# Patient Record
Sex: FEMALE | Race: WHITE | NOT HISPANIC OR LATINO | Employment: OTHER | URBAN - METROPOLITAN AREA
[De-identification: names, ages, dates, MRNs, and addresses within clinical notes are randomized per-mention and may not be internally consistent; named-entity substitution may affect disease eponyms.]

---

## 2022-08-24 RX ORDER — ASPIRIN 81 MG/1
81 TABLET ORAL DAILY
Status: ON HOLD | COMMUNITY
End: 2022-09-13

## 2022-08-24 RX ORDER — CITALOPRAM HYDROBROMIDE 20 MG/1
20 TABLET ORAL DAILY
COMMUNITY

## 2022-08-24 RX ORDER — ATORVASTATIN CALCIUM 80 MG/1
80 TABLET, FILM COATED ORAL DAILY
COMMUNITY

## 2022-08-24 RX ORDER — GLIPIZIDE 5 MG/1
2.5 TABLET ORAL
COMMUNITY

## 2022-08-24 RX ORDER — LISINOPRIL 5 MG/1
5 TABLET ORAL DAILY
COMMUNITY

## 2022-08-24 NOTE — PROGRESS NOTES
08/24/22 1223   Discharge Planning   Barriers to Discharge no caregiver available after discharge   Comment, Barriers to Discharge lives alone, would like to go to TCU, pt states that surgeon is aware of this   Living Arrangements   Is your private residence a single family home or apartment? Single family home  ([facility])   Number of Stairs, Within Home, Primary none   Once home, are you able to live on one level? Yes   Bathroom Shower/Tub Walk-in shower   Support System   Do you have someone available to stay with you one or two nights once you are home? No   Medical Clearance   It is recommended that you call and check with any specialty providers before surgery to see if you need surgical clearance.  Do you see any specialty providers outside of your primary care provider? No   Blood   Known bleeding disorder or coagulopathy? No   Does the patient have any Scientology/cultural preferences related to blood products? No   Education   Has the patient scheduled or completed pre-op total joint education, either in class or online, in the last 12 months? No  (will read book, no computer)   Patient attended total joint pre-op class/received pre-op teaching    (no computer)

## 2022-09-10 NOTE — PHARMACY-ADMISSION MEDICATION HISTORY
Medication reconciliation completed by pre-admitting.    Prior to Admission medications    Medication Sig Last Dose Taking? Auth Provider Long Term End Date   aspirin 81 MG EC tablet Take 81 mg by mouth daily  Yes Reported, Patient     atorvastatin (LIPITOR) 80 MG tablet Take 80 mg by mouth daily  Yes Reported, Patient Yes    Calcium Carb-Cholecalciferol (CALCIUM + D3 PO) Take by mouth daily  Yes Reported, Patient     citalopram (CELEXA) 20 MG tablet Take 20 mg by mouth daily  Yes Reported, Patient Yes    Cyanocobalamin (B-12 PO) Take by mouth daily  Yes Reported, Patient     glipiZIDE (GLUCOTROL) 5 MG tablet Take 2.5 mg by mouth 2 times daily (before meals) Take one half tablet in the morning and one half tab in the evening  Yes Reported, Patient Yes    lisinopril (ZESTRIL) 5 MG tablet Take 5 mg by mouth daily  Yes Reported, Patient Yes    metFORMIN (GLUCOPHAGE) 500 MG tablet Take 1,000 mg by mouth 2 times daily (with meals)  Yes Reported, Patient Yes    omeprazole (PRILOSEC) 20 MG DR capsule Take 20 mg by mouth daily  Yes Reported, Patient     VITAMIN E PO Take by mouth daily  Yes Reported, Patient

## 2022-09-12 ENCOUNTER — HOSPITAL ENCOUNTER (OUTPATIENT)
Facility: CLINIC | Age: 72
Discharge: NURSING, REHAB OR LONG-TERM CARE | End: 2022-09-15
Attending: ORTHOPAEDIC SURGERY | Admitting: ORTHOPAEDIC SURGERY
Payer: COMMERCIAL

## 2022-09-12 ENCOUNTER — APPOINTMENT (OUTPATIENT)
Dept: PHYSICAL THERAPY | Facility: CLINIC | Age: 72
End: 2022-09-12
Attending: ORTHOPAEDIC SURGERY
Payer: COMMERCIAL

## 2022-09-12 ENCOUNTER — ANESTHESIA EVENT (OUTPATIENT)
Dept: SURGERY | Facility: CLINIC | Age: 72
End: 2022-09-12
Payer: COMMERCIAL

## 2022-09-12 ENCOUNTER — APPOINTMENT (OUTPATIENT)
Dept: GENERAL RADIOLOGY | Facility: CLINIC | Age: 72
End: 2022-09-12
Attending: ORTHOPAEDIC SURGERY
Payer: COMMERCIAL

## 2022-09-12 ENCOUNTER — ANESTHESIA (OUTPATIENT)
Dept: SURGERY | Facility: CLINIC | Age: 72
End: 2022-09-12
Payer: COMMERCIAL

## 2022-09-12 DIAGNOSIS — M17.9 OA (OSTEOARTHRITIS) OF KNEE: ICD-10-CM

## 2022-09-12 DIAGNOSIS — Z96.652 S/P LEFT UNICOMPARTMENTAL KNEE REPLACEMENT: Primary | ICD-10-CM

## 2022-09-12 LAB
CREAT SERPL-MCNC: 1.03 MG/DL (ref 0.51–0.95)
CREAT SERPL-MCNC: 1.13 MG/DL (ref 0.51–0.95)
GFR SERPL CREATININE-BSD FRML MDRD: 52 ML/MIN/1.73M2
GFR SERPL CREATININE-BSD FRML MDRD: 58 ML/MIN/1.73M2
GLUCOSE BLDC GLUCOMTR-MCNC: 155 MG/DL (ref 70–99)
GLUCOSE BLDC GLUCOMTR-MCNC: 198 MG/DL (ref 70–99)
GLUCOSE BLDC GLUCOMTR-MCNC: 209 MG/DL (ref 70–99)
GLUCOSE BLDC GLUCOMTR-MCNC: 215 MG/DL (ref 70–99)
GLUCOSE BLDC GLUCOMTR-MCNC: 247 MG/DL (ref 70–99)
HGB BLD-MCNC: 12 G/DL (ref 11.7–15.7)
POTASSIUM SERPL-SCNC: 4.1 MMOL/L (ref 3.4–5.3)

## 2022-09-12 PROCEDURE — 258N000003 HC RX IP 258 OP 636: Performed by: ANESTHESIOLOGY

## 2022-09-12 PROCEDURE — 82565 ASSAY OF CREATININE: CPT | Performed by: ANESTHESIOLOGY

## 2022-09-12 PROCEDURE — 85018 HEMOGLOBIN: CPT | Performed by: ANESTHESIOLOGY

## 2022-09-12 PROCEDURE — 36415 COLL VENOUS BLD VENIPUNCTURE: CPT | Performed by: ANESTHESIOLOGY

## 2022-09-12 PROCEDURE — 250N000011 HC RX IP 250 OP 636: Performed by: PHYSICIAN ASSISTANT

## 2022-09-12 PROCEDURE — 272N000002 HC OR SUPPLY OTHER OPNP: Performed by: ORTHOPAEDIC SURGERY

## 2022-09-12 PROCEDURE — 84132 ASSAY OF SERUM POTASSIUM: CPT | Performed by: ANESTHESIOLOGY

## 2022-09-12 PROCEDURE — C1776 JOINT DEVICE (IMPLANTABLE): HCPCS | Performed by: ORTHOPAEDIC SURGERY

## 2022-09-12 PROCEDURE — C1713 ANCHOR/SCREW BN/BN,TIS/BN: HCPCS | Performed by: ORTHOPAEDIC SURGERY

## 2022-09-12 PROCEDURE — 258N000001 HC RX 258: Performed by: ORTHOPAEDIC SURGERY

## 2022-09-12 PROCEDURE — 250N000013 HC RX MED GY IP 250 OP 250 PS 637: Performed by: PHYSICIAN ASSISTANT

## 2022-09-12 PROCEDURE — 250N000009 HC RX 250: Performed by: ANESTHESIOLOGY

## 2022-09-12 PROCEDURE — 272N000001 HC OR GENERAL SUPPLY STERILE: Performed by: ORTHOPAEDIC SURGERY

## 2022-09-12 PROCEDURE — 710N000009 HC RECOVERY PHASE 1, LEVEL 1, PER MIN: Performed by: ORTHOPAEDIC SURGERY

## 2022-09-12 PROCEDURE — 250N000012 HC RX MED GY IP 250 OP 636 PS 637: Performed by: ANESTHESIOLOGY

## 2022-09-12 PROCEDURE — 999N000065 XR KNEE PORT LEFT 1/2 VIEWS: Mod: LT

## 2022-09-12 PROCEDURE — 250N000011 HC RX IP 250 OP 636: Performed by: NURSE ANESTHETIST, CERTIFIED REGISTERED

## 2022-09-12 PROCEDURE — 258N000003 HC RX IP 258 OP 636: Performed by: ORTHOPAEDIC SURGERY

## 2022-09-12 PROCEDURE — 250N000011 HC RX IP 250 OP 636: Performed by: ORTHOPAEDIC SURGERY

## 2022-09-12 PROCEDURE — 82962 GLUCOSE BLOOD TEST: CPT

## 2022-09-12 PROCEDURE — 370N000017 HC ANESTHESIA TECHNICAL FEE, PER MIN: Performed by: ORTHOPAEDIC SURGERY

## 2022-09-12 PROCEDURE — 250N000009 HC RX 250: Performed by: ORTHOPAEDIC SURGERY

## 2022-09-12 PROCEDURE — 82565 ASSAY OF CREATININE: CPT | Performed by: ORTHOPAEDIC SURGERY

## 2022-09-12 PROCEDURE — 250N000013 HC RX MED GY IP 250 OP 250 PS 637: Performed by: ORTHOPAEDIC SURGERY

## 2022-09-12 PROCEDURE — 250N000011 HC RX IP 250 OP 636: Performed by: ANESTHESIOLOGY

## 2022-09-12 PROCEDURE — 999N000141 HC STATISTIC PRE-PROCEDURE NURSING ASSESSMENT: Performed by: ORTHOPAEDIC SURGERY

## 2022-09-12 PROCEDURE — 250N000013 HC RX MED GY IP 250 OP 250 PS 637: Performed by: ANESTHESIOLOGY

## 2022-09-12 PROCEDURE — 360N000077 HC SURGERY LEVEL 4, PER MIN: Performed by: ORTHOPAEDIC SURGERY

## 2022-09-12 PROCEDURE — 97161 PT EVAL LOW COMPLEX 20 MIN: CPT | Mod: GP

## 2022-09-12 PROCEDURE — 36415 COLL VENOUS BLD VENIPUNCTURE: CPT | Performed by: ORTHOPAEDIC SURGERY

## 2022-09-12 PROCEDURE — 97530 THERAPEUTIC ACTIVITIES: CPT | Mod: GP

## 2022-09-12 PROCEDURE — 250N000009 HC RX 250: Performed by: NURSE ANESTHETIST, CERTIFIED REGISTERED

## 2022-09-12 DEVICE — IMPLANTABLE DEVICE: Type: IMPLANTABLE DEVICE | Site: KNEE | Status: FUNCTIONAL

## 2022-09-12 DEVICE — BONE CEMENT STRK SIMPLEX P SPEEDSET 6192-1-001: Type: IMPLANTABLE DEVICE | Site: KNEE | Status: FUNCTIONAL

## 2022-09-12 RX ORDER — DEXAMETHASONE SODIUM PHOSPHATE 4 MG/ML
INJECTION, SOLUTION INTRA-ARTICULAR; INTRALESIONAL; INTRAMUSCULAR; INTRAVENOUS; SOFT TISSUE PRN
Status: DISCONTINUED | OUTPATIENT
Start: 2022-09-12 | End: 2022-09-12

## 2022-09-12 RX ORDER — EPHEDRINE SULFATE 50 MG/ML
INJECTION, SOLUTION INTRAMUSCULAR; INTRAVENOUS; SUBCUTANEOUS PRN
Status: DISCONTINUED | OUTPATIENT
Start: 2022-09-12 | End: 2022-09-12

## 2022-09-12 RX ORDER — NALOXONE HYDROCHLORIDE 0.4 MG/ML
0.2 INJECTION, SOLUTION INTRAMUSCULAR; INTRAVENOUS; SUBCUTANEOUS
Status: DISCONTINUED | OUTPATIENT
Start: 2022-09-12 | End: 2022-09-15 | Stop reason: HOSPADM

## 2022-09-12 RX ORDER — ALBUTEROL SULFATE 0.83 MG/ML
2.5 SOLUTION RESPIRATORY (INHALATION) EVERY 4 HOURS PRN
Status: DISCONTINUED | OUTPATIENT
Start: 2022-09-12 | End: 2022-09-12 | Stop reason: HOSPADM

## 2022-09-12 RX ORDER — PROPOFOL 10 MG/ML
INJECTION, EMULSION INTRAVENOUS PRN
Status: DISCONTINUED | OUTPATIENT
Start: 2022-09-12 | End: 2022-09-12

## 2022-09-12 RX ORDER — DEXTROSE MONOHYDRATE 25 G/50ML
25-50 INJECTION, SOLUTION INTRAVENOUS
Status: DISCONTINUED | OUTPATIENT
Start: 2022-09-12 | End: 2022-09-15 | Stop reason: HOSPADM

## 2022-09-12 RX ORDER — CEFAZOLIN SODIUM/WATER 2 G/20 ML
2 SYRINGE (ML) INTRAVENOUS SEE ADMIN INSTRUCTIONS
Status: DISCONTINUED | OUTPATIENT
Start: 2022-09-12 | End: 2022-09-12 | Stop reason: HOSPADM

## 2022-09-12 RX ORDER — HYDROMORPHONE HCL IN WATER/PF 6 MG/30 ML
0.2 PATIENT CONTROLLED ANALGESIA SYRINGE INTRAVENOUS
Status: DISCONTINUED | OUTPATIENT
Start: 2022-09-12 | End: 2022-09-15 | Stop reason: HOSPADM

## 2022-09-12 RX ORDER — ONDANSETRON 2 MG/ML
INJECTION INTRAMUSCULAR; INTRAVENOUS PRN
Status: DISCONTINUED | OUTPATIENT
Start: 2022-09-12 | End: 2022-09-12

## 2022-09-12 RX ORDER — ONDANSETRON 2 MG/ML
4 INJECTION INTRAMUSCULAR; INTRAVENOUS EVERY 6 HOURS PRN
Status: DISCONTINUED | OUTPATIENT
Start: 2022-09-12 | End: 2022-09-15 | Stop reason: HOSPADM

## 2022-09-12 RX ORDER — ACETAMINOPHEN 325 MG/1
650 TABLET ORAL EVERY 4 HOURS PRN
Status: DISCONTINUED | OUTPATIENT
Start: 2022-09-15 | End: 2022-09-15 | Stop reason: HOSPADM

## 2022-09-12 RX ORDER — LIDOCAINE 40 MG/G
CREAM TOPICAL
Status: DISCONTINUED | OUTPATIENT
Start: 2022-09-12 | End: 2022-09-12 | Stop reason: HOSPADM

## 2022-09-12 RX ORDER — AMOXICILLIN 250 MG
1 CAPSULE ORAL 2 TIMES DAILY
Status: DISCONTINUED | OUTPATIENT
Start: 2022-09-12 | End: 2022-09-15 | Stop reason: HOSPADM

## 2022-09-12 RX ORDER — KETAMINE HYDROCHLORIDE 10 MG/ML
INJECTION INTRAMUSCULAR; INTRAVENOUS PRN
Status: DISCONTINUED | OUTPATIENT
Start: 2022-09-12 | End: 2022-09-12

## 2022-09-12 RX ORDER — ACETAMINOPHEN 325 MG/1
650 TABLET ORAL EVERY 4 HOURS PRN
Qty: 100 TABLET | Refills: 0 | Status: SHIPPED | OUTPATIENT
Start: 2022-09-12 | End: 2022-09-21

## 2022-09-12 RX ORDER — HYDRALAZINE HYDROCHLORIDE 20 MG/ML
2.5-5 INJECTION INTRAMUSCULAR; INTRAVENOUS EVERY 10 MIN PRN
Status: DISCONTINUED | OUTPATIENT
Start: 2022-09-12 | End: 2022-09-12 | Stop reason: HOSPADM

## 2022-09-12 RX ORDER — FAMOTIDINE 20 MG/1
20 TABLET, FILM COATED ORAL 2 TIMES DAILY
Status: DISCONTINUED | OUTPATIENT
Start: 2022-09-12 | End: 2022-09-15 | Stop reason: HOSPADM

## 2022-09-12 RX ORDER — ENOXAPARIN SODIUM 100 MG/ML
40 INJECTION SUBCUTANEOUS EVERY 24 HOURS
Status: DISCONTINUED | OUTPATIENT
Start: 2022-09-13 | End: 2022-09-15 | Stop reason: HOSPADM

## 2022-09-12 RX ORDER — ENOXAPARIN SODIUM 100 MG/ML
30 INJECTION SUBCUTANEOUS EVERY 24 HOURS
Status: DISCONTINUED | OUTPATIENT
Start: 2022-09-13 | End: 2022-09-12

## 2022-09-12 RX ORDER — BISACODYL 10 MG
10 SUPPOSITORY, RECTAL RECTAL DAILY PRN
Status: DISCONTINUED | OUTPATIENT
Start: 2022-09-12 | End: 2022-09-15 | Stop reason: HOSPADM

## 2022-09-12 RX ORDER — OXYCODONE HYDROCHLORIDE 5 MG/1
5 TABLET ORAL EVERY 4 HOURS PRN
Status: DISCONTINUED | OUTPATIENT
Start: 2022-09-12 | End: 2022-09-12 | Stop reason: HOSPADM

## 2022-09-12 RX ORDER — SODIUM CHLORIDE, SODIUM LACTATE, POTASSIUM CHLORIDE, CALCIUM CHLORIDE 600; 310; 30; 20 MG/100ML; MG/100ML; MG/100ML; MG/100ML
INJECTION, SOLUTION INTRAVENOUS CONTINUOUS
Status: DISCONTINUED | OUTPATIENT
Start: 2022-09-12 | End: 2022-09-12 | Stop reason: HOSPADM

## 2022-09-12 RX ORDER — SODIUM CHLORIDE, SODIUM LACTATE, POTASSIUM CHLORIDE, CALCIUM CHLORIDE 600; 310; 30; 20 MG/100ML; MG/100ML; MG/100ML; MG/100ML
INJECTION, SOLUTION INTRAVENOUS CONTINUOUS
Status: DISCONTINUED | OUTPATIENT
Start: 2022-09-12 | End: 2022-09-15 | Stop reason: HOSPADM

## 2022-09-12 RX ORDER — ACETAMINOPHEN 325 MG/1
975 TABLET ORAL EVERY 8 HOURS
Status: DISCONTINUED | OUTPATIENT
Start: 2022-09-12 | End: 2022-09-15 | Stop reason: HOSPADM

## 2022-09-12 RX ORDER — IBUPROFEN 600 MG/1
600 TABLET, FILM COATED ORAL EVERY 6 HOURS PRN
Status: DISCONTINUED | OUTPATIENT
Start: 2022-09-12 | End: 2022-09-15 | Stop reason: HOSPADM

## 2022-09-12 RX ORDER — ONDANSETRON 2 MG/ML
4 INJECTION INTRAMUSCULAR; INTRAVENOUS EVERY 30 MIN PRN
Status: DISCONTINUED | OUTPATIENT
Start: 2022-09-12 | End: 2022-09-12 | Stop reason: HOSPADM

## 2022-09-12 RX ORDER — POLYETHYLENE GLYCOL 3350 17 G/17G
17 POWDER, FOR SOLUTION ORAL DAILY
Status: DISCONTINUED | OUTPATIENT
Start: 2022-09-13 | End: 2022-09-15 | Stop reason: HOSPADM

## 2022-09-12 RX ORDER — PROPOFOL 10 MG/ML
INJECTION, EMULSION INTRAVENOUS CONTINUOUS PRN
Status: DISCONTINUED | OUTPATIENT
Start: 2022-09-12 | End: 2022-09-12

## 2022-09-12 RX ORDER — LABETALOL HYDROCHLORIDE 5 MG/ML
10 INJECTION, SOLUTION INTRAVENOUS
Status: DISCONTINUED | OUTPATIENT
Start: 2022-09-12 | End: 2022-09-12 | Stop reason: HOSPADM

## 2022-09-12 RX ORDER — CEFAZOLIN SODIUM/WATER 2 G/20 ML
2 SYRINGE (ML) INTRAVENOUS
Status: COMPLETED | OUTPATIENT
Start: 2022-09-12 | End: 2022-09-12

## 2022-09-12 RX ORDER — CELECOXIB 200 MG/1
400 CAPSULE ORAL ONCE
Status: COMPLETED | OUTPATIENT
Start: 2022-09-12 | End: 2022-09-12

## 2022-09-12 RX ORDER — BUPIVACAINE HYDROCHLORIDE AND EPINEPHRINE 5; 5 MG/ML; UG/ML
INJECTION, SOLUTION PERINEURAL
Status: COMPLETED | OUTPATIENT
Start: 2022-09-12 | End: 2022-09-12

## 2022-09-12 RX ORDER — HYDROMORPHONE HCL IN WATER/PF 6 MG/30 ML
0.4 PATIENT CONTROLLED ANALGESIA SYRINGE INTRAVENOUS
Status: DISCONTINUED | OUTPATIENT
Start: 2022-09-12 | End: 2022-09-15 | Stop reason: HOSPADM

## 2022-09-12 RX ORDER — TRANEXAMIC ACID 650 MG/1
1950 TABLET ORAL ONCE
Status: COMPLETED | OUTPATIENT
Start: 2022-09-12 | End: 2022-09-12

## 2022-09-12 RX ORDER — HYDROMORPHONE HYDROCHLORIDE 2 MG/1
4 TABLET ORAL EVERY 4 HOURS PRN
Status: DISCONTINUED | OUTPATIENT
Start: 2022-09-12 | End: 2022-09-14 | Stop reason: ALTCHOICE

## 2022-09-12 RX ORDER — FENTANYL CITRATE 50 UG/ML
50 INJECTION, SOLUTION INTRAMUSCULAR; INTRAVENOUS EVERY 5 MIN PRN
Status: DISCONTINUED | OUTPATIENT
Start: 2022-09-12 | End: 2022-09-12 | Stop reason: HOSPADM

## 2022-09-12 RX ORDER — PROCHLORPERAZINE MALEATE 5 MG
5 TABLET ORAL EVERY 6 HOURS PRN
Status: DISCONTINUED | OUTPATIENT
Start: 2022-09-12 | End: 2022-09-15 | Stop reason: HOSPADM

## 2022-09-12 RX ORDER — NALOXONE HYDROCHLORIDE 0.4 MG/ML
0.4 INJECTION, SOLUTION INTRAMUSCULAR; INTRAVENOUS; SUBCUTANEOUS
Status: DISCONTINUED | OUTPATIENT
Start: 2022-09-12 | End: 2022-09-15 | Stop reason: HOSPADM

## 2022-09-12 RX ORDER — HYDROXYZINE HYDROCHLORIDE 10 MG/1
10 TABLET, FILM COATED ORAL EVERY 6 HOURS PRN
Status: DISCONTINUED | OUTPATIENT
Start: 2022-09-12 | End: 2022-09-15 | Stop reason: HOSPADM

## 2022-09-12 RX ORDER — ONDANSETRON 4 MG/1
4 TABLET, ORALLY DISINTEGRATING ORAL EVERY 30 MIN PRN
Status: DISCONTINUED | OUTPATIENT
Start: 2022-09-12 | End: 2022-09-12 | Stop reason: HOSPADM

## 2022-09-12 RX ORDER — HYDROMORPHONE HYDROCHLORIDE 2 MG/1
2 TABLET ORAL EVERY 4 HOURS PRN
Status: DISCONTINUED | OUTPATIENT
Start: 2022-09-12 | End: 2022-09-14 | Stop reason: ALTCHOICE

## 2022-09-12 RX ORDER — FENTANYL CITRATE 50 UG/ML
INJECTION, SOLUTION INTRAMUSCULAR; INTRAVENOUS PRN
Status: DISCONTINUED | OUTPATIENT
Start: 2022-09-12 | End: 2022-09-12

## 2022-09-12 RX ORDER — LIDOCAINE 40 MG/G
CREAM TOPICAL
Status: DISCONTINUED | OUTPATIENT
Start: 2022-09-12 | End: 2022-09-15 | Stop reason: HOSPADM

## 2022-09-12 RX ORDER — HYDROMORPHONE HCL IN WATER/PF 6 MG/30 ML
0.4 PATIENT CONTROLLED ANALGESIA SYRINGE INTRAVENOUS EVERY 5 MIN PRN
Status: DISCONTINUED | OUTPATIENT
Start: 2022-09-12 | End: 2022-09-12 | Stop reason: HOSPADM

## 2022-09-12 RX ORDER — OXYCODONE HYDROCHLORIDE 5 MG/1
5-10 TABLET ORAL EVERY 4 HOURS PRN
Qty: 26 TABLET | Refills: 0 | Status: SHIPPED | OUTPATIENT
Start: 2022-09-12 | End: 2022-09-15

## 2022-09-12 RX ORDER — CEFAZOLIN SODIUM 2 G/100ML
2 INJECTION, SOLUTION INTRAVENOUS EVERY 8 HOURS
Status: COMPLETED | OUTPATIENT
Start: 2022-09-12 | End: 2022-09-13

## 2022-09-12 RX ORDER — ONDANSETRON 4 MG/1
4 TABLET, ORALLY DISINTEGRATING ORAL EVERY 6 HOURS PRN
Status: DISCONTINUED | OUTPATIENT
Start: 2022-09-12 | End: 2022-09-15 | Stop reason: HOSPADM

## 2022-09-12 RX ORDER — ONDANSETRON 2 MG/ML
4 INJECTION INTRAMUSCULAR; INTRAVENOUS EVERY 6 HOURS
Status: DISPENSED | OUTPATIENT
Start: 2022-09-12 | End: 2022-09-13

## 2022-09-12 RX ORDER — ENOXAPARIN SODIUM 100 MG/ML
40 INJECTION SUBCUTANEOUS EVERY 12 HOURS
Status: DISCONTINUED | OUTPATIENT
Start: 2022-09-13 | End: 2022-09-12

## 2022-09-12 RX ORDER — LISINOPRIL 5 MG/1
5 TABLET ORAL DAILY
Status: DISCONTINUED | OUTPATIENT
Start: 2022-09-12 | End: 2022-09-15 | Stop reason: HOSPADM

## 2022-09-12 RX ORDER — NICOTINE POLACRILEX 4 MG
15-30 LOZENGE BUCCAL
Status: DISCONTINUED | OUTPATIENT
Start: 2022-09-12 | End: 2022-09-15 | Stop reason: HOSPADM

## 2022-09-12 RX ORDER — AMOXICILLIN 250 MG
1-2 CAPSULE ORAL 2 TIMES DAILY
Qty: 30 TABLET | Refills: 0 | Status: SHIPPED | OUTPATIENT
Start: 2022-09-12 | End: 2022-09-16

## 2022-09-12 RX ADMIN — TRANEXAMIC ACID 1950 MG: 650 TABLET ORAL at 08:41

## 2022-09-12 RX ADMIN — ONDANSETRON 4 MG: 2 INJECTION INTRAMUSCULAR; INTRAVENOUS at 19:03

## 2022-09-12 RX ADMIN — HYDROMORPHONE HYDROCHLORIDE 0.6 MG: 1 INJECTION, SOLUTION INTRAMUSCULAR; INTRAVENOUS; SUBCUTANEOUS at 12:20

## 2022-09-12 RX ADMIN — CEFAZOLIN SODIUM 2 G: 2 INJECTION, SOLUTION INTRAVENOUS at 17:39

## 2022-09-12 RX ADMIN — BUPIVACAINE HYDROCHLORIDE AND EPINEPHRINE BITARTRATE 20 ML: 5; .005 INJECTION, SOLUTION EPIDURAL; INTRACAUDAL; PERINEURAL at 09:31

## 2022-09-12 RX ADMIN — SODIUM CHLORIDE 1000 ML: 9 INJECTION, SOLUTION INTRAVENOUS at 19:02

## 2022-09-12 RX ADMIN — ACETAMINOPHEN 975 MG: 325 TABLET, FILM COATED ORAL at 17:51

## 2022-09-12 RX ADMIN — Medication 5 MG: at 10:37

## 2022-09-12 RX ADMIN — PROPOFOL 10 MG: 10 INJECTION, EMULSION INTRAVENOUS at 11:26

## 2022-09-12 RX ADMIN — HYDROMORPHONE HYDROCHLORIDE 4 MG: 2 TABLET ORAL at 19:42

## 2022-09-12 RX ADMIN — SODIUM CHLORIDE, POTASSIUM CHLORIDE, SODIUM LACTATE AND CALCIUM CHLORIDE: 600; 310; 30; 20 INJECTION, SOLUTION INTRAVENOUS at 17:49

## 2022-09-12 RX ADMIN — DEXAMETHASONE SODIUM PHOSPHATE 8 MG: 4 INJECTION, SOLUTION INTRA-ARTICULAR; INTRALESIONAL; INTRAMUSCULAR; INTRAVENOUS; SOFT TISSUE at 10:24

## 2022-09-12 RX ADMIN — FAMOTIDINE 20 MG: 20 TABLET ORAL at 19:43

## 2022-09-12 RX ADMIN — Medication 2 G: at 10:15

## 2022-09-12 RX ADMIN — Medication 5 MG: at 10:35

## 2022-09-12 RX ADMIN — INSULIN ASPART 2 UNITS: 100 INJECTION, SOLUTION INTRAVENOUS; SUBCUTANEOUS at 13:48

## 2022-09-12 RX ADMIN — FENTANYL CITRATE 50 MCG: 50 INJECTION, SOLUTION INTRAMUSCULAR; INTRAVENOUS at 10:45

## 2022-09-12 RX ADMIN — HYDROMORPHONE HYDROCHLORIDE 0.4 MG: 1 INJECTION, SOLUTION INTRAMUSCULAR; INTRAVENOUS; SUBCUTANEOUS at 11:49

## 2022-09-12 RX ADMIN — Medication 5 MG: at 11:27

## 2022-09-12 RX ADMIN — FENTANYL CITRATE 100 MCG: 50 INJECTION, SOLUTION INTRAMUSCULAR; INTRAVENOUS at 10:24

## 2022-09-12 RX ADMIN — ACETAMINOPHEN 975 MG: 325 TABLET, FILM COATED ORAL at 23:57

## 2022-09-12 RX ADMIN — OXYCODONE HYDROCHLORIDE 5 MG: 5 TABLET ORAL at 14:51

## 2022-09-12 RX ADMIN — HYDRALAZINE HYDROCHLORIDE 2.5 MG: 20 INJECTION, SOLUTION INTRAMUSCULAR; INTRAVENOUS at 13:28

## 2022-09-12 RX ADMIN — SODIUM CHLORIDE, POTASSIUM CHLORIDE, SODIUM LACTATE AND CALCIUM CHLORIDE: 600; 310; 30; 20 INJECTION, SOLUTION INTRAVENOUS at 09:04

## 2022-09-12 RX ADMIN — PROPOFOL 40 MCG/KG/MIN: 10 INJECTION, EMULSION INTRAVENOUS at 11:03

## 2022-09-12 RX ADMIN — PROPOFOL 50 MG: 10 INJECTION, EMULSION INTRAVENOUS at 10:47

## 2022-09-12 RX ADMIN — LIDOCAINE HYDROCHLORIDE 5 ML: 10 INJECTION, SOLUTION EPIDURAL; INFILTRATION; INTRACAUDAL; PERINEURAL at 10:24

## 2022-09-12 RX ADMIN — PROPOFOL 30 MG: 10 INJECTION, EMULSION INTRAVENOUS at 10:46

## 2022-09-12 RX ADMIN — Medication 10 MG: at 10:57

## 2022-09-12 RX ADMIN — SENNOSIDES AND DOCUSATE SODIUM 1 TABLET: 50; 8.6 TABLET ORAL at 19:41

## 2022-09-12 RX ADMIN — FENTANYL CITRATE 50 MCG: 50 INJECTION, SOLUTION INTRAMUSCULAR; INTRAVENOUS at 10:46

## 2022-09-12 RX ADMIN — LISINOPRIL 5 MG: 5 TABLET ORAL at 17:51

## 2022-09-12 RX ADMIN — SODIUM CHLORIDE, POTASSIUM CHLORIDE, SODIUM LACTATE AND CALCIUM CHLORIDE: 600; 310; 30; 20 INJECTION, SOLUTION INTRAVENOUS at 11:04

## 2022-09-12 RX ADMIN — ONDANSETRON HYDROCHLORIDE 4 MG: 2 INJECTION, SOLUTION INTRAVENOUS at 12:38

## 2022-09-12 RX ADMIN — Medication 5 MG: at 11:03

## 2022-09-12 RX ADMIN — PROPOFOL 150 MG: 10 INJECTION, EMULSION INTRAVENOUS at 10:24

## 2022-09-12 RX ADMIN — CELECOXIB 400 MG: 200 CAPSULE ORAL at 08:40

## 2022-09-12 ASSESSMENT — ACTIVITIES OF DAILY LIVING (ADL)
ADLS_ACUITY_SCORE: 22

## 2022-09-12 NOTE — ANESTHESIA PROCEDURE NOTES
Airway       Patient location during procedure: OR       Procedure Start/Stop Times: 9/12/2022 10:27 AM  Staff -        CRNA: Micah Avina APRN CRNA       Performed By: CRNA  Consent for Airway        Urgency: elective  Indications and Patient Condition       Indications for airway management: shade-procedural       Induction type:intravenous       Mask difficulty assessment: 2 - vent by mask + OA or adjuvant +/- NMBA    Final Airway Details       Final airway type: supraglottic airway    Supraglottic Airway Details        Type: LMA       Brand: I-Gel       LMA size: 4    Post intubation assessment        Placement verified by: capnometry, equal breath sounds and chest rise        Number of attempts at approach: 1       Number of other approaches attempted: 0       Secured with: commercial tube ferro       Ease of procedure: easy       Dentition: Intact and Unchanged    Medication(s) Administered   Medication Administration Time: 9/12/2022 10:27 AM

## 2022-09-12 NOTE — ANESTHESIA CARE TRANSFER NOTE
Patient: Erin Ott    Procedure: Procedure(s):  Left medial unicompartmental knee arthroplasty, open reduction internal fixation intraoperative intramedullary  tibial fracture.       Diagnosis: OA (osteoarthritis) of knee [M17.10]  Diagnosis Additional Information: No value filed.    Anesthesia Type:   General     Note:    Oropharynx: oropharynx clear of all foreign objects  Level of Consciousness: drowsy  Oxygen Supplementation: face mask  Level of Supplemental Oxygen (L/min / FiO2): 5  Independent Airway: airway patency satisfactory and stable  Dentition: dentition unchanged  Vital Signs Stable: post-procedure vital signs reviewed and stable  Report to RN Given: handoff report given  Patient transferred to: PACU    Handoff Report: Identifed the Patient, Identified the Reponsible Provider, Reviewed the pertinent medical history, Discussed the surgical course, Reviewed Intra-OP anesthesia mangement and issues during anesthesia, Set expectations for post-procedure period and Allowed opportunity for questions and acknowledgement of understanding      Vitals:  Vitals Value Taken Time   /94 09/12/22 1301   Temp     Pulse 108 09/12/22 1302   Resp 25 09/12/22 1302   SpO2 100 % 09/12/22 1302   Vitals shown include unvalidated device data.    Electronically Signed By: Dean Dennis Severson, APRN CRNA  September 12, 2022  1:03 PM

## 2022-09-12 NOTE — ANESTHESIA PREPROCEDURE EVALUATION
Anesthesia Pre-Procedure Evaluation    Patient: Erin Ott   MRN: [id number] : 1950        Procedure : Procedure(s):  Left medial unicompartmental knee arthroplasty          Past Medical History:   Diagnosis Date     Arthritis      Depression      Diabetes (H)      Gastroesophageal reflux disease      Hypertension      Obese      PONV (postoperative nausea and vomiting)       Past Surgical History:   Procedure Laterality Date     HC KNEE ARTHROSCOPY, MED/LAT MENISCUS REPAIR Left      ORTHOPEDIC SURGERY Right     partial knee replacement      Allergies   Allergen Reactions     Latex Itching     Adhesive Tape Rash     Ak-Mycin [Erythromycin] Rash      Social History     Tobacco Use     Smoking status: Never Smoker     Smokeless tobacco: Never Used   Substance Use Topics     Alcohol use: Yes     Comment: rarely      Wt Readings from Last 1 Encounters:   22 95.7 kg (211 lb)        Anesthesia Evaluation            ROS/MED HX  ENT/Pulmonary:  - neg pulmonary ROS     Neurologic:       Cardiovascular:     (+) hypertension-----    METS/Exercise Tolerance:     Hematologic:       Musculoskeletal:       GI/Hepatic:     (+) GERD,     Renal/Genitourinary:       Endo:     (+) type II DM, Obesity (BMI 35),     Psychiatric/Substance Use:       Infectious Disease:       Malignancy:       Other:            Physical Exam    Airway        Mallampati: I   TM distance: > 3 FB   Neck ROM: full     Respiratory Devices and Support         Dental           Cardiovascular   cardiovascular exam normal          Pulmonary                   OUTSIDE LABS:  CBC:   Lab Results   Component Value Date    HGB 12.0 2022     BMP: No results found for: NA, POTASSIUM, CHLORIDE, CO2, BUN, CR, GLC  COAGS: No results found for: PTT, INR, FIBR  POC: No results found for: BGM, HCG, HCGS  HEPATIC: No results found for: ALBUMIN, PROTTOTAL, ALT, AST, GGT, ALKPHOS, BILITOTAL, BILIDIRECT, SERGIO  OTHER: No results found for: PH, LACT,  A1C, KELLY, PHOS, MAG, LIPASE, AMYLASE, TSH, T4, T3, CRP, SED    Anesthesia Plan    ASA Status:  2   NPO Status:  NPO Appropriate    Anesthesia Type: General.     - Airway: LMA   Induction: Intravenous.   Maintenance: Balanced.        Consents    Anesthesia Plan(s) and associated risks, benefits, and realistic alternatives discussed. Questions answered and patient/representative(s) expressed understanding.    - Discussed:     - Discussed with:  Patient         Postoperative Care    Pain management: IV analgesics, Oral pain medications, Peripheral nerve block (Single Shot).   PONV prophylaxis: Ondansetron (or other 5HT-3), Dexamethasone or Solumedrol     Comments:    Other Comments: Patient requests DANA Hurley MD

## 2022-09-12 NOTE — PLAN OF CARE
[facility]      OUTPATIENT PHYSICAL THERAPY EVALUATION  PLAN OF TREATMENT FOR OUTPATIENT REHABILITATION  (COMPLETE FOR INITIAL CLAIMS ONLY)  Patient's Last Name, First Name, M.I.  YOB: 1950  Erin Ott                        Provider's Name  [facility] Medical Record No.  [id number]                               Onset Date:  09/12/22   Start of Care Date:    9/12/2022     Type:     _X_PT   ___OT   ___SLP Medical Diagnosis:   s/p L knee surgery                        PT Diagnosis:  impaired functional mobility   Visits from SOC:  1   _________________________________________________________________________________  Plan of Treatment/Functional Goals    Planned Interventions: balance training, bed mobility training, gait training, home exercise program, neuromuscular re-education, ROM (range of motion), strengthening, transfer training, progressive activity/exercise, risk factor education, home program guidelines     Goals: See Physical Therapy Goals on Care Plan in [facility] electronic health record.    Therapy Frequency: Daily  Predicted Duration of Therapy Intervention: 09/14/22  _________________________________________________________________________________    I CERTIFY THE NEED FOR THESE SERVICES FURNISHED UNDER        THIS PLAN OF TREATMENT AND WHILE UNDER MY CARE     (Physician co-signature of this document indicates review and certification of the therapy plan).               ,      Referring Physician: Chester Rodriguez MD            Initial Assessment        See Physical Therapy evaluation dated   in Epic electronic health record.

## 2022-09-12 NOTE — ANESTHESIA PROCEDURE NOTES
Adductor canal Procedure Note    Pre-Procedure   Staff -        Anesthesiologist:  Vanesa Hurley MD       Performed By: anesthesiologist       Location: pre-op       Procedure Start/Stop Times: 9/12/2022 9:31 AM and 9/12/2022 9:34 AM       Pre-Anesthestic Checklist: patient identified, IV checked, site marked, risks and benefits discussed, informed consent, monitors and equipment checked, pre-op evaluation, at physician/surgeon's request and post-op pain management  Timeout:       Correct Patient: Yes        Correct Procedure: Yes        Correct Site: Yes        Correct Position: Yes        Correct Laterality: Yes        Site Marked: Yes  Procedure Documentation  Procedure: Adductor canal       Laterality: left       Patient Position: supine       Skin prep: Betadine       Needle Type: short bevel       Needle Gauge: 20.        Needle Length (Inches): 4        Ultrasound guided       1. Ultrasound was used to identify targeted nerve, plexus, vascular marker, or fascial plane and place a needle adjacent to it in real-time.       2. Ultrasound was used to visualize the spread of anesthetic in close proximity to the above referenced structure.       4. The visualized anatomic structures appeared normal.       5. There were no apparent abnormal pathologic findings.    Assessment/Narrative         Bolus given via needle..        Secured via.        Insertion/Infusion Method: Single Shot       Complications: none       Injection made incrementally with aspirations every 5 mL.    Medication(s) Administered   Bupivacaine 0.5% w/ 1:200K Epi (Other) - Other   20 mL - 9/12/2022 9:31:00 AM  Medication Administration Time: 9/12/2022 9:31 AM

## 2022-09-12 NOTE — OP NOTE
Procedure Date: 09/12/2022    PREOPERATIVE DIAGNOSIS:  Left knee medial compartment osteoarthritis.    POSTOPERATIVE DIAGNOSIS:  Left knee medial compartment osteoarthritis, intraoperative tibial eminence fracture.      PROCEDURE:    1.  Left knee unicompartmental knee arthroplasty using Arthrex medial compartment unicompartmental knee arthroplasty.    2.  Open reduction and internal fixation of left tibial eminence fracture.    SURGEON:  Chester Rodriguez MD    FIRST ASSISTANT:  Mariana Burch PA-C    INDICATIONS FOR SURGERY:  Ms. Ott is a very pleasant 72-year-old female who has had medial sided left knee pain for some time now, has failed conservative management with oral analgesics, activity modifications and injections, and now wished to proceed with operative intervention.  We had a long discussion of risks, benefits, and alternatives of unicompartmental knee arthroplasty.  She understands the risks, benefits, and alternatives.  She has a medial compartment unicompartmental knee arthroplasty on her right side, which has done extremely well for the last 8 years.  She understands the risks, benefits and alternatives of the procedure, and wished to proceed with surgery.    NARRATIVE EVENTS:  After thorough evaluation and proper identification of the patient to be operated on, Ms. Ott was taken to the operating room where she underwent a general anesthetic as well as a femoral nerve block, placed supine on the operating table.  Left leg was prepped and draped in the usual sterile manner.  After appropriate surgical pause confirming the patient's extremity to be operated on, 10 minutes after the patient received 2 grams of Ancef, left leg was exsanguinated and tourniquet was raised to 300 mmHg on the left upper thigh.  We approached the left knee through a median parapatellar arthrotomy.  The skin and soft tissue sharply dissected down to the patella where a median parapatellar arthrotomy was performed.  We  subluxed the patella, removed the infrapatellar fat pad, and evaluated the compartments of the knee.  The medial compartment had complete loss of cartilage, particularly distally and posteriorly with grade 4 chondral changes both on the femur and on the tibia.  The femoral trochlea was well preserved as well as the lateral compartment as well as the lateral meniscus and the medial facet of the patella showed some grade 2 and grade 3 chondral wear, but I felt that this was stable and she had been tolerating this well it would be reasonable to proceed with unicompartmental knee arthroplasty.  So at this point, we performed a mild medial release.  We placed into position our Arthrex proximal tibial cut block and made our tibial resection of 7 mm width of the tibial plateau perpendicular to the long axis of the tibia with the vertical axis of the cut just medial to the ACL footprint.  We removed this biscuit of proximal tibia and measured our flexion gap.  We felt we had about a 9 mm flexion gap.  This equated to about a 10 mm extension gap so we made our distal femoral resection at 8 mm.  We did this using the Arthrex cut blocks tensioning out the extension space.  Once this was done, we then proceeded to make our posterior resection again using the ACL tensioning guide to create a 16 mm flexion gap.  Once this was done, we then sized the distal femur.  It sized to fit a size 2 Arthrex femoral component and we made our chamfer and leg resections.  Once this was done, we removed excess soft tissue from the knee, mainly the remnants of the medial meniscus.  At this point, we sized the proximal tibia.  It sized to fit a size 3 tibial component and we placed our trial implants into position, a size 2 femur, 3 tibia with a 10 mm thick polyethylene insert.  This gave us good stability and full range of motion and appropriate soft tissue tension.  So at this point, we marked our tibial location, punched for our tibial keel,  and prepared to cement in our final components using one batch of Simplex SpeedSet cement.  We cemented in a size 2 femur and a 3 tibia.  Once cement had cured, we retrialed our polyethylene inserts and found that the 11 mm insert gave us the best stability and the appropriate range of motion but in the process of removing the trial polyethylene, we recognized that there was a small fracture in the tibial eminence right as the ACL attached, so we felt that we should address this.  We did so by reducing the fracture anatomically and placing a 2.4 screw over a washer that reduced the fracture nicely.  We also placed a #2 FiberWire stitch in a Maurilio-Wilbur fashion in the ACL and then we drilled 2 bone tunnels and passed these sutures through the bone tunnels in the ACL footprint and then passed these sutures through this such that we tied them on the medial tibial face around near the area of the pes anserine and we tied these over a bone bridge stable at helping to act as a belt suspender stabilizing our ACL and taking some tension off the fracture repair.  At this point, we felt that bringing the knee through a range of motion that the fracture was stable, the knee was stable.  We had good range of motion.  We had appropriate soft tissue tension.  We thoroughly irrigated the knee with a dilute Betadine solution followed by saline.  We removed all the excess cement from the knee and then we closed the arthrotomy with #1 Vicryl sutures and a running #1 Stratafix suture.  We placed 1 gram of powder vancomycin deep to the arthrotomy and we closed the skin and soft tissue with absorbable sutures and a Prineo skin closure.  The patient was placed in a well-padded postoperative dressing.  She was taken to recovery room in stable condition.  She tolerated the procedure without difficulty.    When she begins rehabilitation, she can weightbear as tolerated with full knee range of motion but I will have her do her formal  rehabilitation work with an ACL stabilizing brace.    Chester Rodriguez MD        D: 2022   T: 2022   MT: [id number]    Name:     PIPER TSE  MRN:      [id number]        Account:        [id number]   :      1950           Procedure Date: 2022     Document: [id number]

## 2022-09-12 NOTE — ANESTHESIA POSTPROCEDURE EVALUATION
Patient: Erin Ott    Procedure: Procedure(s):  Left medial unicompartmental knee arthroplasty, open reduction internal fixation intraoperative intramedullary  tibial fracture.       Anesthesia Type:  General    Note:  Disposition: Outpatient   Postop Pain Control: Uneventful            Sign Out: Well controlled pain   PONV: No   Neuro/Psych: Uneventful            Sign Out: Acceptable/Baseline neuro status   Airway/Respiratory: Uneventful            Sign Out: Acceptable/Baseline resp. status   CV/Hemodynamics: Uneventful            Sign Out: Acceptable CV status; No obvious hypovolemia; No obvious fluid overload   Other NRE: NONE   DID A NON-ROUTINE EVENT OCCUR? No           Last vitals:  Vitals Value Taken Time   /81 09/12/22 1425   Temp 98.3  F (36.8  C) 09/12/22 1300   Pulse 86 09/12/22 1426   Resp 14 09/12/22 1426   SpO2 93 % 09/12/22 1426   Vitals shown include unvalidated device data.    Electronically Signed By: Vanesa Hurley MD  September 12, 2022  2:28 PM

## 2022-09-12 NOTE — BRIEF OP NOTE
[facility]    Brief Operative Note    Pre-operative diagnosis: OA (osteoarthritis) of knee [M17.10]  Post-operative diagnosis Same as pre-operative diagnosis    Procedure: Procedure(s):  Left medial unicompartmental knee arthroplasty, open reduction internal fixation intraoperative intramedullary  tibial fracture.  Surgeon: Surgeon(s) and Role:     * Chester Rodriguez MD - Primary     * Mariana Burch PA-C - Assisting  Anesthesia: General with Block   Estimated Blood Loss: Less than 10 ml    Drains: None  Specimens: * No specimens in log *  Findings:   None.  Complications: None.  Implants:   Implant Name Type Inv. Item Serial No.  Lot No. LRB No. Used Action   BONE CEMENT STRK SIMPLEX P SPEEDSET 6192-1-001 - [id number] Cement, Bone BONE CEMENT STRK SIMPLEX P SPEEDSET 6192-1-001  [employer] [id number] Left 1 Implanted   IMP COMP UKA IBALANCE FEM UNI JESS SZ 2 LT-MEDIAL AR-[id number] - [id number] Total Joint Component/Insert IMP COMP UKA IBALANCE FEM UNI JESS SZ 2 LT-MEDIAL AR-501-[id number]  [employer] [id number] Left 1 Implanted   IBALANCE UKA TIBIAL CEMENTED TRAY, SIZE 3, LM/RL Total Joint Component/Insert   [employer] [id number] Left 1 Implanted   IBALANCE UKA, TIBIAL BEARING IMPLANT, VIT E, SIZE 3, 11MM THICKNESS Total Joint Component/Insert   [employer]X [id number] Left 1 Implanted   IMP SCR SYN CORTEX 2.4X32MM SELF .662 - [id number] Metallic Hardware/[place] IMP SCR SYN CORTEX 2.4X32MM SELF .662  [id number] Left 1 Implanted

## 2022-09-12 NOTE — PROGRESS NOTES
09/12/22 1800   Quick Adds   Type of Visit Initial PT Evaluation   Living Environment   People in Home alone   Current Living Arrangements other (see comments)  ([facility])   Home Accessibility no concerns   Number of Stairs, Within Home, Primary none   Living Environment Comments Pt lives alone in [facility]; no PRADIP. Walk in shower with small lip, 1 grab bar.   Self-Care   Usual Activity Tolerance moderate   Current Activity Tolerance fair   Regular Exercise No   Equipment Currently Used at Home raised toilet seat  (pt owns SPC, borrowing shower chair)   Fall history within last six months no   Activity/Exercise/Self-Care Comment Pt reports IND with mobility and ADLs/self cares at baseline. Was using SPC recently d/t pain.   General Information   Onset of Illness/Injury or Date of Surgery 09/12/22   Referring Physician Chester Rodriguez MD   Patient/Family Therapy Goals Statement (PT) go to TCU   Pertinent History of Current Problem (include personal factors and/or comorbidities that impact the POC) Pt is 71 yo female who underwentLeft medial unicompartmental knee arthroplasty, open reduction internal fixation intraoperative intramedullary  tibial fracture on 9/12/22.   Existing Precautions/Restrictions fall;weight bearing   Weight-Bearing Status - LLE weight-bearing as tolerated   Cognition   Affect/Mental Status (Cognition) WNL   Orientation Status (Cognition) oriented x 3   Pain Assessment   Patient Currently in Pain Yes, see Vital Sign flowsheet   Integumentary/Edema   Integumentary/Edema Comments L ACE bandage intact   Posture    Posture Forward head position;Protracted shoulders   Range of Motion (ROM)   ROM Comment L knee flexion ~25 degrees   Strength (Manual Muscle Testing)   Strength Comments unable to significantly WB through LLE d/t numbness; difficulty with LLE SLR; RLE WFL   Bed Mobility   Comment, (Bed Mobility) supine<>sit with CGA and use of strap   Transfers   Comment, (Transfers)  sit<>stand with CGA and FWW   Gait/Stairs (Locomotion)   Comment, (Gait/Stairs) side stepping along EOB with FWW and min A; mild buckling of LLE d/t numbness   Balance   Balance Comments impaired; requiring use of AD   Sensory Examination   Sensory Perception other (describe)   Sensory Perception Comments noting numbness in LLE   Clinical Impression   Criteria for Skilled Therapeutic Intervention Yes, treatment indicated   PT Diagnosis (PT) impaired functional mobility   Influenced by the following impairments post-op status, weakness, pain   Functional limitations due to impairments difficulty with bed mobility, transfers, ambulation   Clinical Presentation (PT Evaluation Complexity) Stable/Uncomplicated   Clinical Presentation Rationale clinical judgement   Clinical Decision Making (Complexity) low complexity   Planned Therapy Interventions (PT) balance training;bed mobility training;gait training;home exercise program;neuromuscular re-education;ROM (range of motion);strengthening;transfer training;progressive activity/exercise;risk factor education;home program guidelines   Anticipated Equipment Needs at Discharge (PT) walker, standard   Risk & Benefits of therapy have been explained evaluation/treatment results reviewed;care plan/treatment goals reviewed;risks/benefits reviewed;current/potential barriers reviewed;participants voiced agreement with care plan;participants included;patient   PT Discharge Planning   PT Discharge Recommendation (DC Rec)   (defer to ortho)   PT Rationale for DC Rec Patient reports her goal that she discussed with surgeon prior to surgery is to go to TCU to increase mobility prior to returning home as she lives alone. Ambulation limited this date due to numbness in LLE with mild buckling.   PT Brief overview of current status SPT with FWW to commode for nursing   Plan of Care Review   Plan of Care Reviewed With patient   Total Evaluation Time   Total Evaluation Time (Minutes) 10    Physical Therapy Goals   PT Frequency Daily   PT Predicted Duration/Target Date for Goal Attainment 09/14/22   PT Goals Bed Mobility;Transfers;Gait   PT: Bed Mobility Modified independent;Supine to/from sit;Within precautions   PT: Transfers Modified independent;Sit to/from stand;Assistive device;Within precautions   PT: Gait Modified independent;Assistive device;Greater than 200 feet;Within precautions

## 2022-09-13 ENCOUNTER — APPOINTMENT (OUTPATIENT)
Dept: PHYSICAL THERAPY | Facility: CLINIC | Age: 72
End: 2022-09-13
Attending: ORTHOPAEDIC SURGERY
Payer: COMMERCIAL

## 2022-09-13 ENCOUNTER — APPOINTMENT (OUTPATIENT)
Dept: OCCUPATIONAL THERAPY | Facility: CLINIC | Age: 72
End: 2022-09-13
Attending: ORTHOPAEDIC SURGERY
Payer: COMMERCIAL

## 2022-09-13 LAB
GLUCOSE BLDC GLUCOMTR-MCNC: 112 MG/DL (ref 70–99)
GLUCOSE BLDC GLUCOMTR-MCNC: 147 MG/DL (ref 70–99)
GLUCOSE BLDC GLUCOMTR-MCNC: 152 MG/DL (ref 70–99)
GLUCOSE BLDC GLUCOMTR-MCNC: 160 MG/DL (ref 70–99)
GLUCOSE BLDC GLUCOMTR-MCNC: 185 MG/DL (ref 70–99)
GLUCOSE BLDC GLUCOMTR-MCNC: 57 MG/DL (ref 70–99)
HBA1C MFR BLD: 7.2 %
HGB BLD-MCNC: 9.9 G/DL (ref 11.7–15.7)
PLATELET # BLD AUTO: 313 10E3/UL (ref 150–450)

## 2022-09-13 PROCEDURE — 250N000013 HC RX MED GY IP 250 OP 250 PS 637: Performed by: NURSE PRACTITIONER

## 2022-09-13 PROCEDURE — 97110 THERAPEUTIC EXERCISES: CPT | Mod: GP

## 2022-09-13 PROCEDURE — 36415 COLL VENOUS BLD VENIPUNCTURE: CPT | Performed by: ORTHOPAEDIC SURGERY

## 2022-09-13 PROCEDURE — 97116 GAIT TRAINING THERAPY: CPT | Mod: GP

## 2022-09-13 PROCEDURE — 99207 PR NO BILLABLE SERVICE THIS VISIT: CPT | Performed by: NURSE PRACTITIONER

## 2022-09-13 PROCEDURE — 85049 AUTOMATED PLATELET COUNT: CPT | Performed by: ORTHOPAEDIC SURGERY

## 2022-09-13 PROCEDURE — 82962 GLUCOSE BLOOD TEST: CPT

## 2022-09-13 PROCEDURE — 97165 OT EVAL LOW COMPLEX 30 MIN: CPT | Mod: GO

## 2022-09-13 PROCEDURE — 97530 THERAPEUTIC ACTIVITIES: CPT | Mod: GP

## 2022-09-13 PROCEDURE — 83036 HEMOGLOBIN GLYCOSYLATED A1C: CPT | Performed by: ORTHOPAEDIC SURGERY

## 2022-09-13 PROCEDURE — 97535 SELF CARE MNGMENT TRAINING: CPT | Mod: GO

## 2022-09-13 PROCEDURE — 250N000011 HC RX IP 250 OP 636: Performed by: ORTHOPAEDIC SURGERY

## 2022-09-13 PROCEDURE — L1852 KO DOUBLE UPRIGHT PREFAB OTS: HCPCS

## 2022-09-13 PROCEDURE — 250N000013 HC RX MED GY IP 250 OP 250 PS 637: Performed by: PHYSICIAN ASSISTANT

## 2022-09-13 PROCEDURE — 250N000013 HC RX MED GY IP 250 OP 250 PS 637: Performed by: ORTHOPAEDIC SURGERY

## 2022-09-13 PROCEDURE — 85018 HEMOGLOBIN: CPT | Performed by: ORTHOPAEDIC SURGERY

## 2022-09-13 PROCEDURE — 96372 THER/PROPH/DIAG INJ SC/IM: CPT | Performed by: ORTHOPAEDIC SURGERY

## 2022-09-13 RX ORDER — PANTOPRAZOLE SODIUM 40 MG/1
40 TABLET, DELAYED RELEASE ORAL DAILY
Status: DISCONTINUED | OUTPATIENT
Start: 2022-09-13 | End: 2022-09-15 | Stop reason: HOSPADM

## 2022-09-13 RX ORDER — CITALOPRAM HYDROBROMIDE 20 MG/1
20 TABLET ORAL DAILY
Status: DISCONTINUED | OUTPATIENT
Start: 2022-09-13 | End: 2022-09-15 | Stop reason: HOSPADM

## 2022-09-13 RX ORDER — ATORVASTATIN CALCIUM 40 MG/1
80 TABLET, FILM COATED ORAL DAILY
Status: DISCONTINUED | OUTPATIENT
Start: 2022-09-13 | End: 2022-09-15 | Stop reason: HOSPADM

## 2022-09-13 RX ADMIN — ACETAMINOPHEN 975 MG: 325 TABLET, FILM COATED ORAL at 15:50

## 2022-09-13 RX ADMIN — ENOXAPARIN SODIUM 40 MG: 40 INJECTION SUBCUTANEOUS at 14:40

## 2022-09-13 RX ADMIN — HYDROMORPHONE HYDROCHLORIDE 2 MG: 2 TABLET ORAL at 18:22

## 2022-09-13 RX ADMIN — HYDROXYZINE HYDROCHLORIDE 10 MG: 10 TABLET ORAL at 00:05

## 2022-09-13 RX ADMIN — METFORMIN HYDROCHLORIDE 1000 MG: 500 TABLET, FILM COATED ORAL at 09:01

## 2022-09-13 RX ADMIN — GLIPIZIDE 2.5 MG: 5 TABLET ORAL at 09:04

## 2022-09-13 RX ADMIN — FAMOTIDINE 20 MG: 20 TABLET ORAL at 07:51

## 2022-09-13 RX ADMIN — SENNOSIDES AND DOCUSATE SODIUM 1 TABLET: 50; 8.6 TABLET ORAL at 22:16

## 2022-09-13 RX ADMIN — CEFAZOLIN SODIUM 2 G: 2 INJECTION, SOLUTION INTRAVENOUS at 02:36

## 2022-09-13 RX ADMIN — METFORMIN HYDROCHLORIDE 1000 MG: 500 TABLET, FILM COATED ORAL at 15:52

## 2022-09-13 RX ADMIN — HYDROMORPHONE HYDROCHLORIDE 2 MG: 2 TABLET ORAL at 02:35

## 2022-09-13 RX ADMIN — PANTOPRAZOLE SODIUM 40 MG: 40 TABLET, DELAYED RELEASE ORAL at 09:01

## 2022-09-13 RX ADMIN — SENNOSIDES AND DOCUSATE SODIUM 1 TABLET: 50; 8.6 TABLET ORAL at 07:50

## 2022-09-13 RX ADMIN — HYDROMORPHONE HYDROCHLORIDE 2 MG: 2 TABLET ORAL at 18:24

## 2022-09-13 RX ADMIN — ACETAMINOPHEN 975 MG: 325 TABLET, FILM COATED ORAL at 07:49

## 2022-09-13 RX ADMIN — CITALOPRAM HYDROBROMIDE 20 MG: 20 TABLET ORAL at 09:01

## 2022-09-13 RX ADMIN — HYDROMORPHONE HYDROCHLORIDE 4 MG: 2 TABLET ORAL at 11:14

## 2022-09-13 RX ADMIN — Medication 1 LOZENGE: at 00:05

## 2022-09-13 RX ADMIN — ATORVASTATIN CALCIUM 80 MG: 40 TABLET, FILM COATED ORAL at 09:01

## 2022-09-13 RX ADMIN — LISINOPRIL 5 MG: 5 TABLET ORAL at 07:49

## 2022-09-13 RX ADMIN — FAMOTIDINE 20 MG: 20 TABLET ORAL at 22:16

## 2022-09-13 RX ADMIN — HYDROMORPHONE HYDROCHLORIDE 4 MG: 2 TABLET ORAL at 22:16

## 2022-09-13 RX ADMIN — HYDROMORPHONE HYDROCHLORIDE 4 MG: 2 TABLET ORAL at 06:51

## 2022-09-13 RX ADMIN — GLIPIZIDE 2.5 MG: 5 TABLET ORAL at 15:50

## 2022-09-13 ASSESSMENT — ACTIVITIES OF DAILY LIVING (ADL)
IADL_COMMENTS: INDP BASELINE
ADLS_ACUITY_SCORE: 21
ADLS_ACUITY_SCORE: 21
ADLS_ACUITY_SCORE: 22
DEPENDENT_IADLS:: INDEPENDENT
ADLS_ACUITY_SCORE: 21
ADLS_ACUITY_SCORE: 22

## 2022-09-13 NOTE — PROGRESS NOTES
"Orthopedic Surgery  9/13/2022  POD#: 1    S: Patient voices no complaints today. Pain managed well, sitting comfortably at bedside.    O: Blood pressure 112/47, pulse 94, temperature 97.3  F (36.3  C), temperature source Temporal, resp. rate 14, height 1.651 m (5' 5\"), weight 95.5 kg (210 lb 9.6 oz), SpO2 94 %.  Lab Results   Component Value Date    HGB 9.9 09/13/2022     No results found for: INR     I/O last 3 completed shifts:  In: 1810 [P.O.:160; I.V.:1650]  Out: 2000 [Urine:2000]    Neurovascularly intact.  Calves are negative bilaterally, both soft and nontender.  The wound is C/D/I. Exofin intact and dry.  The wound looks good with minimal erythema of the surrounding skin.    A: Ms. Ott is doing well status post Procedure(s):  Left medial unicompartmental knee arthroplasty, open reduction internal fixation intraoperative tibial fracture.    P: Orthotics to return to fit patient with ACL stabilizing brace, with ACE wrap and dressings removed.  1. Mobilize and continue physical therapy. WBAT with ACL stabilizing brace and walker.  May remove brace to bathe, sleep, and when resting and elevating leg.  2. Anticoagulation - discharge on ASA  3. Pain management - controlled  4. Anticipate discharge to home vs TCU pending authorization.  Pt prefers TCU but awaiting insurance auth and bed placement. SWS consulted to discuss options with patient.      Mariana Burch PA-C  O: [phone]  "

## 2022-09-13 NOTE — CONSULTS
"Medicine Brief Note  9/13/2022 7:40 AM     Chart reviewed.    73 yo F with PMH of OA, T2DM (on oral medications), GERD, HTN, and obesity who presents for elective medial unicompartment knee arthroplasty, and ORIF intraoperative intramedullary tibial fracture under GETA with block on 9/12/2022.  EBL < 10.  No apparent anesthesia complication.     /60 (BP Location: Right arm)   Pulse 88   Temp 97  F (36.1  C) (Temporal)   Resp 14   Ht 1.651 m (5' 5\")   Wt 95.5 kg (210 lb 9.6 oz)   SpO2 95%   BMI 35.05 kg/m       -209  Plt 313  Hgb 9.9 (preop 12).    Pending tx evaluation today.    Resumed home medications.    Continue correctional scale coverage.  Hypoglycemia protocol.  Abx, DVTp (Lovenox), pain management and activity restrictions per surgery.      No acute medical issues to manage at this time.    Medicine available for any acute issues should they arise.      Please page Medicine pager for any acute medical issues.    Otherwise Medicine service will not formally see.    BALTA Kat CNP   "

## 2022-09-13 NOTE — PROGRESS NOTES
09/13/22 0900   Quick Adds   Type of Visit Initial Occupational Therapy Evaluation   Living Environment   People in Home alone   Current Living Arrangements other (see comments)   Home Accessibility no concerns   Number of Stairs, Within Home, Primary none   Living Environment Comments alone in townhouse. accessible   Self-Care   Usual Activity Tolerance moderate   Current Activity Tolerance fair   Regular Exercise No   Equipment Currently Used at Home shower chair;raised toilet seat  (leg )   Fall history within last six months no   Activity/Exercise/Self-Care Comment indp with mobility, self care and ADL   Instrumental Activities of Daily Living (IADL)   IADL Comments indp baseline   General Information   Onset of Illness/Injury or Date of Surgery 09/12/22   Referring Physician Chester Rodriguez MD   Patient/Family Therapy Goal Statement (OT) to go to rehab   Additional Occupational Profile Info/Pertinent History of Current Problem 73 y/o female POD 1 Left medial unicompartmental knee arthroplasty, open reduction internal fixation intraoperative tibial fracture.   Existing Precautions/Restrictions fall;weight bearing   General Observations and Info per discussion with ortho PA we are awaiting a knee brace to be fitted prior to OOB activity. order for WBAT   Cognitive Status Examination   Orientation Status orientation to person, place and time   Cognitive Status Comments eccentric   Visual Perception   Visual Impairment/Limitations corrective lenses full-time   Sensory   Sensory Quick Adds No deficits were identified   Pain Assessment   Patient Currently in Pain Yes, see Vital Sign flowsheet   Integumentary/Edema   Integumentary/Edema Comments ACE wrap in place on L knee   Range of Motion Comprehensive   Comment, General Range of Motion limited to 50% full ROM at  baseline deficits bilaterally   Strength Comprehensive (MMT)   Comment, General Manual Muscle Testing (MMT) Assessment  strength  equal bilaterally. generalized weakness   Coordination   Upper Extremity Coordination No deficits were identified   Bed Mobility   Bed Mobility supine-sit   Supine-Sit [place] (Bed Mobility) minimum assist (75% patient effort)   Assistive Device (Bed Mobility) leg    Comment (Bed Mobility) dangle at EOB ok per ortho PA   Activities of Daily Living   BADL Assessment/Intervention bathing;lower body dressing;upper body dressing   Bathing Assessment/Intervention   [place] Level (Bathing) moderate assist (50% patient effort)   Upper Body Dressing Assessment/Training   [place] Level (Upper Body Dressing) set up   Lower Body Dressing Assessment/Training   [place] Level (Lower Body Dressing) maximum assist (25% patient effort)   Clinical Impression   Criteria for Skilled Therapeutic Interventions Met (OT) Yes, treatment indicated;Evaluation only   OT Diagnosis decreased function in ADL   OT Problem List-Impairments impacting ADL problems related to;pain;post-surgical precautions;mobility;strength   Assessment of Occupational Performance 3-5 Performance Deficits   Identified Performance Deficits bathing, transfer, toileting, dressing   Planned Therapy Interventions (OT) ADL retraining   Intervention Comments defer to next level of care   Clinical Decision Making Complexity (OT) low complexity   Risk & Benefits of therapy have been explained evaluation/treatment results reviewed;care plan/treatment goals reviewed;risks/benefits reviewed;current/potential barriers reviewed;participants included;participants voiced agreement with care plan;patient   Clinical Impression Comments decreased function in ADL warrants skilled IP OT tx   OT Discharge Planning   OT Discharge Recommendation (DC Rec)   (per ortho)   OT Rationale for DC Rec pt below baseline function in self care and ADL and requires skilled IP OT tx. does not have caregiver support at home. lives alone.  pt OUT status, plan for discharge to TCU.  needs OT to return to PLOF but can defer to next level of care. eval only.   Total Evaluation Time (Minutes)   Total Evaluation Time (Minutes) 23

## 2022-09-13 NOTE — PLAN OF CARE
Goal Outcome Evaluation:        Patient vital signs are at baseline: Yes  Patient able to ambulate as they were prior to admission or with assist devices provided by therapies during their stay:  Yespt is A-1 with gate belt and walker.  Patient MUST void prior to discharge:  Yes  Patient able to tolerate oral intake:  Yes  Pain has adequate pain control using Oral analgesics:  Yes  Does patient have an identified :  No,  Reason:  No identified .  Has goal D/C date and time been discussed with patient:  No,  Reason:  pt will discharge when meets criteria.

## 2022-09-13 NOTE — CONSULTS
Care Management Initial Consult    General Information  Assessment completed with: Patient, Patient  Type of CM/SW Visit: Initial Assessment    Primary Care Provider verified and updated as needed: Yes   Readmission within the last 30 days: no previous admission in last 30 days      Reason for Consult: discharge planning  Advance Care Planning:            Communication Assessment  Patient's communication style: spoken language (English or Bilingual)    Hearing Difficulty or Deaf: no   Wear Glasses or Blind: yes    Cognitive  Cognitive/Neuro/Behavioral: WDL                      Living Environment:   People in home: alone     Current living Arrangements: house      Able to return to prior arrangements:         Family/Social Support:  Care provided by: self  Provides care for: no one  Marital Status: Single  Sibling(s)          Description of Support System: Supportive, Involved    Support Assessment: Adequate family and caregiver support    Current Resources:   Patient receiving home care services: No     Community Resources: None  Equipment currently used at home: shower chair, raised toilet seat (leg )  Supplies currently used at home:      Employment/Financial:  Employment Status:          Financial Concerns:             Lifestyle & Psychosocial Needs:  Social Determinants of Health     Tobacco Use: Low Risk      Smoking Tobacco Use: Never Smoker     Smokeless Tobacco Use: Never Used   Alcohol Use: Not on file   Financial Resource Strain: Not on file   Food Insecurity: Not on file   Transportation Needs: Not on file   Physical Activity: Not on file   Stress: Not on file   Social Connections: Not on file   Intimate Partner Violence: Not on file   Depression: Not on file   Housing Stability: Not on file       Functional Status:  Prior to admission patient needed assistance:   Dependent ADLs:: Independent  Dependent IADLs:: Independent  Assesssment of Functional Status: Not at baseline with ADL  Functioning    Mental Health Status:          Chemical Dependency Status:                Values/Beliefs:  Spiritual, Cultural Beliefs, Quaker Practices, Values that affect care:                 Additional Information:    Met with pt at bedside to discuss discharge planning. Pt explained that she is independent at baseline and does not have anyone at home to take care of her. Pt would like to go to TCU at discharge, pt open to [facility] or [facility]. Explained that if pt is ambulating well once the brace is on there is potential that her insurance may not cover a TCU stay. Offered the option of homecare. Will see how pt does after PT with her brace. Referrals sent. SW following.     Addendum    Updated that pt ambulated well in PT. Awaiting PT updated recommendations. Pt explained that she would like this writer to send more TCU referrals nearby and would consider paying privately if not covered by insurance.    CLAUDE Warren, [facility]  Inpatient Care Coordination  Ortho/Spine Unit  [phone]  Rylei Valladares, [facility]

## 2022-09-13 NOTE — PROGRESS NOTES
Here for functional knee brace.  Patient has padding and ace-wrap.  I have an orthosis that may fit her once wrap removed, otherwise orthosis would have to be ordered.    I would concerned on changing volume.  I talked to RN who will follow when  Or  comes in to see patient. Perhaps post op knee brace short term and patient seen for functional brace outpatient?  Please call orthotics [phone].  Thanks,Florida Evans.

## 2022-09-13 NOTE — PLAN OF CARE
Goal Outcome Evaluation:      Patient vital signs are at baseline: Yes  Patient able to ambulate as they were prior to admission or with assist devices provided by therapies during their stay:  No,  Reason:  up with assist of one, gait belt/walker to the bedside commode.  Patient MUST void prior to discharge:  Yes  Patient able to tolerate oral intake:  Yes  Pain has adequate pain control using Oral analgesics:  Yes  Does patient have an identified :  No,  Reason:  lives alone.  Has goal D/C date and time been discussed with patient:  Yes, pt going to TCU when stable.

## 2022-09-13 NOTE — PROGRESS NOTES
I fit patient with functional ACL Gaston Ardon earlier.  I explained wear,care and precautions.  I came by later with written instructions.  Patient said did well with P.T. and orthosis still feels good, didn't have any impingement when ambulating.  I gave patient contact information as well.  Valeriy Evans.

## 2022-09-13 NOTE — PLAN OF CARE
PT A&O, vitals monitored on RA.  Up SBA in room, gb and ww with hinged knee brace.  Pain managed with po dilaudid.  Voiding.  CMS intact.  Prineo dressing intact. Pt requesting to discharge to TCU, however waiting on PT recommendations.  SW planning with pt in process.

## 2022-09-14 ENCOUNTER — APPOINTMENT (OUTPATIENT)
Dept: PHYSICAL THERAPY | Facility: CLINIC | Age: 72
End: 2022-09-14
Attending: ORTHOPAEDIC SURGERY
Payer: COMMERCIAL

## 2022-09-14 LAB
GLUCOSE BLDC GLUCOMTR-MCNC: 109 MG/DL (ref 70–99)
GLUCOSE BLDC GLUCOMTR-MCNC: 127 MG/DL (ref 70–99)
GLUCOSE BLDC GLUCOMTR-MCNC: 130 MG/DL (ref 70–99)
GLUCOSE BLDC GLUCOMTR-MCNC: 144 MG/DL (ref 70–99)
GLUCOSE BLDC GLUCOMTR-MCNC: 148 MG/DL (ref 70–99)
GLUCOSE BLDC GLUCOMTR-MCNC: 162 MG/DL (ref 70–99)
HGB BLD-MCNC: 10.2 G/DL (ref 11.7–15.7)

## 2022-09-14 PROCEDURE — 97530 THERAPEUTIC ACTIVITIES: CPT | Mod: GP

## 2022-09-14 PROCEDURE — 82962 GLUCOSE BLOOD TEST: CPT

## 2022-09-14 PROCEDURE — 96372 THER/PROPH/DIAG INJ SC/IM: CPT | Performed by: ORTHOPAEDIC SURGERY

## 2022-09-14 PROCEDURE — 250N000011 HC RX IP 250 OP 636: Performed by: ORTHOPAEDIC SURGERY

## 2022-09-14 PROCEDURE — 250N000013 HC RX MED GY IP 250 OP 250 PS 637: Performed by: NURSE PRACTITIONER

## 2022-09-14 PROCEDURE — 250N000013 HC RX MED GY IP 250 OP 250 PS 637: Performed by: PHYSICIAN ASSISTANT

## 2022-09-14 PROCEDURE — 250N000013 HC RX MED GY IP 250 OP 250 PS 637: Performed by: ORTHOPAEDIC SURGERY

## 2022-09-14 PROCEDURE — 85018 HEMOGLOBIN: CPT | Performed by: ORTHOPAEDIC SURGERY

## 2022-09-14 PROCEDURE — 97116 GAIT TRAINING THERAPY: CPT | Mod: GP

## 2022-09-14 PROCEDURE — 36415 COLL VENOUS BLD VENIPUNCTURE: CPT | Performed by: ORTHOPAEDIC SURGERY

## 2022-09-14 RX ORDER — OXYCODONE HYDROCHLORIDE 5 MG/1
5-10 TABLET ORAL
Status: DISCONTINUED | OUTPATIENT
Start: 2022-09-14 | End: 2022-09-15 | Stop reason: HOSPADM

## 2022-09-14 RX ADMIN — ATORVASTATIN CALCIUM 80 MG: 40 TABLET, FILM COATED ORAL at 08:40

## 2022-09-14 RX ADMIN — ACETAMINOPHEN 975 MG: 325 TABLET, FILM COATED ORAL at 08:40

## 2022-09-14 RX ADMIN — IBUPROFEN 600 MG: 600 TABLET ORAL at 08:40

## 2022-09-14 RX ADMIN — ACETAMINOPHEN 975 MG: 325 TABLET, FILM COATED ORAL at 17:04

## 2022-09-14 RX ADMIN — OXYCODONE HYDROCHLORIDE 10 MG: 5 TABLET ORAL at 10:30

## 2022-09-14 RX ADMIN — SENNOSIDES AND DOCUSATE SODIUM 1 TABLET: 50; 8.6 TABLET ORAL at 08:41

## 2022-09-14 RX ADMIN — HYDROMORPHONE HYDROCHLORIDE 4 MG: 2 TABLET ORAL at 02:18

## 2022-09-14 RX ADMIN — HYDROMORPHONE HYDROCHLORIDE 4 MG: 2 TABLET ORAL at 06:33

## 2022-09-14 RX ADMIN — FAMOTIDINE 20 MG: 20 TABLET ORAL at 19:47

## 2022-09-14 RX ADMIN — METFORMIN HYDROCHLORIDE 1000 MG: 500 TABLET, FILM COATED ORAL at 08:40

## 2022-09-14 RX ADMIN — OXYCODONE HYDROCHLORIDE 5 MG: 5 TABLET ORAL at 14:35

## 2022-09-14 RX ADMIN — METFORMIN HYDROCHLORIDE 1000 MG: 500 TABLET, FILM COATED ORAL at 17:04

## 2022-09-14 RX ADMIN — OXYCODONE HYDROCHLORIDE 5 MG: 5 TABLET ORAL at 22:33

## 2022-09-14 RX ADMIN — SENNOSIDES AND DOCUSATE SODIUM 1 TABLET: 50; 8.6 TABLET ORAL at 19:47

## 2022-09-14 RX ADMIN — LISINOPRIL 5 MG: 5 TABLET ORAL at 08:40

## 2022-09-14 RX ADMIN — OXYCODONE HYDROCHLORIDE 5 MG: 5 TABLET ORAL at 18:26

## 2022-09-14 RX ADMIN — GLIPIZIDE 2.5 MG: 5 TABLET ORAL at 08:40

## 2022-09-14 RX ADMIN — HYDROXYZINE HYDROCHLORIDE 10 MG: 10 TABLET ORAL at 08:40

## 2022-09-14 RX ADMIN — PANTOPRAZOLE SODIUM 40 MG: 40 TABLET, DELAYED RELEASE ORAL at 08:41

## 2022-09-14 RX ADMIN — ENOXAPARIN SODIUM 40 MG: 40 INJECTION SUBCUTANEOUS at 14:35

## 2022-09-14 RX ADMIN — CITALOPRAM HYDROBROMIDE 20 MG: 20 TABLET ORAL at 08:40

## 2022-09-14 RX ADMIN — FAMOTIDINE 20 MG: 20 TABLET ORAL at 08:41

## 2022-09-14 RX ADMIN — GLIPIZIDE 2.5 MG: 5 TABLET ORAL at 17:04

## 2022-09-14 ASSESSMENT — ACTIVITIES OF DAILY LIVING (ADL)
ADLS_ACUITY_SCORE: 21

## 2022-09-14 NOTE — PLAN OF CARE
Goal Outcome Evaluation:                Patient vital signs are at baseline: Yes  Patient able to ambulate as they were prior to admission or with assist devices provided by therapies during their stay:  Yes, pt is A-1 with walker and gate belt.  Patient MUST void prior to discharge:  Yes  Patient able to tolerate oral intake:  Yes  Pain has adequate pain control using Oral analgesics:  Yes  Does patient have an identified :  Yes  Has goal D/C date and time been discussed with patient:  Yes, pt requested to disharge to TCU, SW warking on it.     Pt is A&OX3, hinged barce on when she  up.

## 2022-09-14 NOTE — PLAN OF CARE
Pt A&O, vitals monitored on RA.  Pt agitated today about discharge disposition - see SW note.  Pain medications changed from dilaudid to oxycodone, pt demanding every 4 hours and upset if even 1 minute beyond time available.  LLE swollen today, incision CDI.  Per ortho ok to not don brace for short distances, mostly will be needed at home.  Voiding.  Regular diet.  Discharge pending.

## 2022-09-14 NOTE — PROGRESS NOTES
"Orthopedic Surgery  9/14/2022    S: Patient voices no complaints today.     O: Blood pressure 139/58, pulse 90, temperature 97.1  F (36.2  C), temperature source Temporal, resp. rate 14, height 1.651 m (5' 5\"), weight 95.5 kg (210 lb 9.6 oz), SpO2 93 %.  Lab Results   Component Value Date    HGB 10.2 09/14/2022     No results found for: INR     Neurovascularly intact.  Calves are negative bilaterally, both soft and nontender.  The wound is C/D/I.  The wound looks good with minimal erythema of the surrounding skin.    A: Ms. Ott is doing well status post Procedure(s):  1.  Left knee unicompartmental knee arthroplasty using Arthrex medial compartment unicompartmental knee arthroplasty.   2.  Open reduction and internal fixation of left tibial eminence fracture..    P: Continue physical therapy.   Anticoagulation lovenox, home on ASA  Pain management  Discharge planning, Sw working on TCU vs home with home care    Chester Rodriguez MD  [phone]    "

## 2022-09-14 NOTE — PLAN OF CARE
Patient vital signs are at baseline: Yes  Patient able to ambulate as they were prior to admission or with assist devices provided by therapies during their stay:  No,  Reason:  Pt plans to go to TCU.    Patient MUST void prior to discharge:  Yes  Patient able to tolerate oral intake:  Yes  Pain has adequate pain control using Oral analgesics:  Yes  Does patient have an identified :  Yes  Has goal D/C date and time been discussed with patient:  Yes

## 2022-09-14 NOTE — PROGRESS NOTES
Care Management Follow Up    Length of Stay (days): 0    Expected Discharge Date: 09/14/2022     Concerns to be Addressed:  Patient requesting to go to TCU, patient hospital class status is Out-patient.     Patient plan of care discussed at interdisciplinary rounds: Yes    Anticipated Discharge Disposition:  Patient requesting to go to TCU as she does not have any one to help her at home.     Anticipated Discharge Services:  TCU vs home with home care.    Anticipated Discharge DME:  worker    Patient/family educated on Medicare website which has current facility and service quality ratings:  yes  Education Provided on the Discharge Plan: yes, reviewed with patient with ortho doctor in room. Patient is a out-patient class, explained to patient that we are not her insurance would pay for TCU secondary to her being out-patient status.  Called in [employer] advantage medicare for authorization for TCU. Waiting atomization.     Also discussed the possibility that she might have to pay privately if her insurance doesn't cover her. She would need between $5000 - $[phone] down payment before she transfer to a facility under private pay situation. She reported that she's not worried about this.       Referrals Placed by CM/SW:  yes  Private pay costs discussed: private room/amenity fees, transportation costs and insurance costs out of pocket expenses, co-pays and deductibles    Addendum: [employer] has approved 7 days TCU stay at [facility] for tomorrow. Patient wanted [facility].    Transport: Reviewed out of pocket cost for [facility] transport, $81.80 for base rate and $5.26 per mile to the destination. Spoke with patient and sister-in-law, Chelsie, they expressed understanding and are agreeable to this.     Dischrge tomorrow Thursday 9/15/22 to [facility] @ 1430 via  wheelchair.    Unit and PA notified.    Marjan Emmanuel, [facility] SYDNIE   Inpatient Care Coordination   Supervisor  [facility]  [facility]  [phone]    Marjan Emmanuel, [facility] Center[facility]

## 2022-09-15 ENCOUNTER — APPOINTMENT (OUTPATIENT)
Dept: PHYSICAL THERAPY | Facility: CLINIC | Age: 72
End: 2022-09-15
Attending: ORTHOPAEDIC SURGERY
Payer: COMMERCIAL

## 2022-09-15 ENCOUNTER — LAB REQUISITION (OUTPATIENT)
Dept: LAB | Facility: CLINIC | Age: 72
End: 2022-09-15

## 2022-09-15 VITALS
HEART RATE: 91 BPM | BODY MASS INDEX: 35.09 KG/M2 | DIASTOLIC BLOOD PRESSURE: 76 MMHG | RESPIRATION RATE: 16 BRPM | TEMPERATURE: 97.3 F | SYSTOLIC BLOOD PRESSURE: 167 MMHG | WEIGHT: 210.6 LBS | HEIGHT: 65 IN | OXYGEN SATURATION: 95 %

## 2022-09-15 DIAGNOSIS — Z11.1 ENCOUNTER FOR SCREENING FOR RESPIRATORY TUBERCULOSIS: ICD-10-CM

## 2022-09-15 DIAGNOSIS — Z96.652 S/P LEFT UNICOMPARTMENTAL KNEE REPLACEMENT: ICD-10-CM

## 2022-09-15 LAB
GLUCOSE BLDC GLUCOMTR-MCNC: 124 MG/DL (ref 70–99)
GLUCOSE BLDC GLUCOMTR-MCNC: 136 MG/DL (ref 70–99)
GLUCOSE BLDC GLUCOMTR-MCNC: 99 MG/DL (ref 70–99)
HOLD SPECIMEN: NORMAL
PLATELET # BLD AUTO: 284 10E3/UL (ref 150–450)

## 2022-09-15 PROCEDURE — 36415 COLL VENOUS BLD VENIPUNCTURE: CPT | Performed by: ORTHOPAEDIC SURGERY

## 2022-09-15 PROCEDURE — 82962 GLUCOSE BLOOD TEST: CPT

## 2022-09-15 PROCEDURE — 97116 GAIT TRAINING THERAPY: CPT | Mod: GP | Performed by: PHYSICAL THERAPIST

## 2022-09-15 PROCEDURE — 97110 THERAPEUTIC EXERCISES: CPT | Mod: GP | Performed by: PHYSICAL THERAPIST

## 2022-09-15 PROCEDURE — 96372 THER/PROPH/DIAG INJ SC/IM: CPT | Performed by: ORTHOPAEDIC SURGERY

## 2022-09-15 PROCEDURE — 85049 AUTOMATED PLATELET COUNT: CPT | Performed by: ORTHOPAEDIC SURGERY

## 2022-09-15 PROCEDURE — 250N000013 HC RX MED GY IP 250 OP 250 PS 637: Performed by: ORTHOPAEDIC SURGERY

## 2022-09-15 PROCEDURE — 250N000011 HC RX IP 250 OP 636: Performed by: ORTHOPAEDIC SURGERY

## 2022-09-15 PROCEDURE — 250N000013 HC RX MED GY IP 250 OP 250 PS 637: Performed by: NURSE PRACTITIONER

## 2022-09-15 PROCEDURE — 250N000013 HC RX MED GY IP 250 OP 250 PS 637: Performed by: PHYSICIAN ASSISTANT

## 2022-09-15 RX ORDER — OXYCODONE HYDROCHLORIDE 5 MG/1
5-10 TABLET ORAL EVERY 4 HOURS PRN
Qty: 30 TABLET | Refills: 0 | Status: SHIPPED | OUTPATIENT
Start: 2022-09-15 | End: 2022-09-19

## 2022-09-15 RX ADMIN — GLIPIZIDE 2.5 MG: 5 TABLET ORAL at 07:31

## 2022-09-15 RX ADMIN — OXYCODONE HYDROCHLORIDE 5 MG: 5 TABLET ORAL at 13:50

## 2022-09-15 RX ADMIN — POLYETHYLENE GLYCOL 3350 17 G: 17 POWDER, FOR SOLUTION ORAL at 07:31

## 2022-09-15 RX ADMIN — OXYCODONE HYDROCHLORIDE 10 MG: 5 TABLET ORAL at 10:21

## 2022-09-15 RX ADMIN — OXYCODONE HYDROCHLORIDE 5 MG: 5 TABLET ORAL at 06:28

## 2022-09-15 RX ADMIN — ACETAMINOPHEN 975 MG: 325 TABLET, FILM COATED ORAL at 09:37

## 2022-09-15 RX ADMIN — ENOXAPARIN SODIUM 40 MG: 40 INJECTION SUBCUTANEOUS at 12:42

## 2022-09-15 RX ADMIN — LISINOPRIL 5 MG: 5 TABLET ORAL at 07:31

## 2022-09-15 RX ADMIN — ATORVASTATIN CALCIUM 80 MG: 40 TABLET, FILM COATED ORAL at 07:31

## 2022-09-15 RX ADMIN — CITALOPRAM HYDROBROMIDE 20 MG: 20 TABLET ORAL at 07:31

## 2022-09-15 RX ADMIN — PANTOPRAZOLE SODIUM 40 MG: 40 TABLET, DELAYED RELEASE ORAL at 07:31

## 2022-09-15 RX ADMIN — METFORMIN HYDROCHLORIDE 1000 MG: 500 TABLET, FILM COATED ORAL at 07:31

## 2022-09-15 RX ADMIN — SENNOSIDES AND DOCUSATE SODIUM 1 TABLET: 50; 8.6 TABLET ORAL at 07:31

## 2022-09-15 RX ADMIN — FAMOTIDINE 20 MG: 20 TABLET ORAL at 07:31

## 2022-09-15 RX ADMIN — OXYCODONE HYDROCHLORIDE 5 MG: 5 TABLET ORAL at 02:28

## 2022-09-15 ASSESSMENT — ACTIVITIES OF DAILY LIVING (ADL)
ADLS_ACUITY_SCORE: 21

## 2022-09-15 NOTE — TELEPHONE ENCOUNTER
New admission to TCU, did not come with a script for oxycodone. Nurse called the hospital, but no one is there to do this now.

## 2022-09-15 NOTE — PROVIDER NOTIFICATION
Received call from TCU requesting copy of hard copy of oxycodone script. Called [facility] with request, information given to [facility] reception...they stated they would notify on call of urgent need for script. Fax number for TCU given (had received from TCU).

## 2022-09-15 NOTE — PLAN OF CARE
Goal Outcome Evaluation:           Patient vital signs are at baseline: Yes  Patient able to ambulate as they were prior to admission or with assist devices provided by therapies during their stay:  Yes  Patient MUST void prior to discharge:  Yes  Patient able to tolerate oral intake:  Yes  Pain has adequate pain control using Oral analgesics:  Yes  Does patient have an identified :  Yes  Has goal D/C date and time been discussed with patient:  Yes,9/15/22 to TCU.

## 2022-09-15 NOTE — PLAN OF CARE
Goal Outcome Evaluation:  Patient vital signs are at baseline: Yes  Patient able to ambulate as they were prior to admission or with assist devices provided by therapies during their stay:  Yes  Patient MUST void prior to discharge:  Yes  Patient able to tolerate oral intake:  Yes  Pain has adequate pain control using Oral analgesics:  Yes  Does patient have an identified :  Yes  Has goal D/C date and time been discussed with patient:  Yes    A&Ox4, VSS, on RA.  LS CTA.  Pain managed with scheduled Tylenol, prn oxycodone. Pain rated 3-8/10.  LLE edema noted, incision with clear bandage on, no drainage.  Ice pack applied prn.  Denies numbness/tingling, CMS intact.  Up with assist 1/gait belt/walker.  Voiding without difficulty, no BM, passing flatus.  PIV removed.  Discharge packet given to .  Discharge reviewed with patient.  All personal belongings in patients possession at discharge.  Discharged via w/c to TCU.

## 2022-09-15 NOTE — PROGRESS NOTES
Care Management Discharge Note    Discharge Date: 09/14/2022       Discharge Disposition:  TCU [facility] today    Discharge Services:  TCU    Discharge DME:  TCU will suppy    Discharge Transportation: [facility] wheelchair     Private pay costs discussed: private room/amenity fees and transportation costs    PAS Confirmation Code: [id number]   Patient/family educated on Medicare website which has current facility and service quality ratings: yes     Education Provided on the Discharge Plan:  yes  Persons Notified of Discharge Plans: yes  Patient/Family in Agreement with the Plan:  yes    Handoff Referral Completed: No    Additional Information:  Patient is discharging to [facility] ([place], [place]) today via wheelchair at 230p. Orders have been faxed and confirmed with facility.      wheelchair @ 230pm.    RICHI Clayton   Inpatient Care Coordination   Supervisor  [facility]  [phone]        RICHI Garcia

## 2022-09-15 NOTE — PROGRESS NOTES
"Orthopedic Surgery  9/15/2022  POD#: 3    S: Patient voices no complaints today.     O: Blood pressure (!) 167/76, pulse 91, temperature 97.3  F (36.3  C), temperature source Temporal, resp. rate 16, height 1.651 m (5' 5\"), weight 95.5 kg (210 lb 9.6 oz), SpO2 95 %.  Lab Results   Component Value Date    HGB 10.2 09/14/2022     No results found for: INR     I/O last 3 completed shifts:  In: 180 [P.O.:180]  Out: 550 [Urine:550]    Neurovascularly intact.  Calves are negative bilaterally, both soft and nontender.  The wound is C/D/I. Exofin dressing intact without drainage. A couple very small blisters distally, which appear normal.  The wound looks good with minimal erythema of the surrounding skin.    A: Ms. Ott is doing well status post Procedure(s):  1.  Left knee unicompartmental knee arthroplasty using Arthrex medial compartment unicompartmental knee arthroplasty.   2.  Open reduction and internal fixation of left tibial eminence fracture..    P: No additional dressings to be placed on incisional mesh  1. Mobilize and continue physical therapy. WBAT ambulating using ACL stabilizing brace.  May remove when not ambulating.  2. Anticoagulation - discharge on ASA  3. Pain management - controlled  4. Anticipate discharge to [facility] this afternoon.      Mariana Burch PA-C  O: [phone]  "

## 2022-09-15 NOTE — PROGRESS NOTES
Care Management Discharge Note    Discharge Date: 09/14/2022       Additional Information:  PAS completed 0853 on 9/15/22. [id number]    CLAUDE Luo, [facility]  Emergency Room   [phone]-Please contact the SW on the floor in which the patient is staying for any questions or concerns

## 2022-09-15 NOTE — PLAN OF CARE
VSS, Afebrile.   Lung sounds clear.    +BS +flatus.  Tolerating diet.   Voiding.   CMS+  Pain controlled. Using Oxycodone 5mgs every 4hrs, Tylenol.   Blisters around incision and ortho ok to not use brace for short distances, mostly will be needed at home  Moves well. SBA  Alert and oriented   Discharge plan: Tomorrow TCU WC transport tbd    No significant issues this shift, will continue to monitor.  Goal Outcome Evaluation:    Plan of Care Reviewed With: patient

## 2022-09-15 NOTE — PLAN OF CARE
Physical Therapy Discharge Summary    Reason for therapy discharge:    Discharged to transitional care facility.    Progress towards therapy goal(s). See goals on Care Plan in [facility] electronic health record for goal details.  Goals not met.  Barriers to achieving goals:   discharge from facility.    Therapy recommendation(s):    Continued therapy is recommended.  Rationale/Recommendations:   .  Continued PT to progress LE strength, ROM and independence with all mobility.  Goal Outcome Evaluation:

## 2022-09-16 ENCOUNTER — TRANSITIONAL CARE UNIT VISIT (OUTPATIENT)
Dept: GERIATRICS | Facility: CLINIC | Age: 72
End: 2022-09-16
Payer: COMMERCIAL

## 2022-09-16 VITALS
DIASTOLIC BLOOD PRESSURE: 67 MMHG | SYSTOLIC BLOOD PRESSURE: 125 MMHG | WEIGHT: 210 LBS | RESPIRATION RATE: 16 BRPM | BODY MASS INDEX: 34.99 KG/M2 | TEMPERATURE: 97.4 F | OXYGEN SATURATION: 95 % | HEART RATE: 76 BPM | HEIGHT: 65 IN

## 2022-09-16 DIAGNOSIS — Z87.81 S/P ORIF (OPEN REDUCTION INTERNAL FIXATION) FRACTURE: ICD-10-CM

## 2022-09-16 DIAGNOSIS — D62 ACUTE BLOOD LOSS ANEMIA: ICD-10-CM

## 2022-09-16 DIAGNOSIS — E11.9 TYPE 2 DIABETES MELLITUS WITHOUT COMPLICATION, WITHOUT LONG-TERM CURRENT USE OF INSULIN (H): ICD-10-CM

## 2022-09-16 DIAGNOSIS — N18.2 CKD (CHRONIC KIDNEY DISEASE) STAGE 2, GFR 60-89 ML/MIN: ICD-10-CM

## 2022-09-16 DIAGNOSIS — M17.12 OSTEOARTHRITIS OF LEFT KNEE, UNSPECIFIED OSTEOARTHRITIS TYPE: Primary | ICD-10-CM

## 2022-09-16 DIAGNOSIS — K21.9 GASTROESOPHAGEAL REFLUX DISEASE, UNSPECIFIED WHETHER ESOPHAGITIS PRESENT: ICD-10-CM

## 2022-09-16 DIAGNOSIS — Z98.890 S/P ORIF (OPEN REDUCTION INTERNAL FIXATION) FRACTURE: ICD-10-CM

## 2022-09-16 DIAGNOSIS — E66.01 CLASS 2 SEVERE OBESITY WITH SERIOUS COMORBIDITY AND BODY MASS INDEX (BMI) OF 35.0 TO 35.9 IN ADULT, UNSPECIFIED OBESITY TYPE (H): ICD-10-CM

## 2022-09-16 DIAGNOSIS — R53.81 PHYSICAL DECONDITIONING: ICD-10-CM

## 2022-09-16 DIAGNOSIS — Z96.652 S/P LEFT UNICOMPARTMENTAL KNEE REPLACEMENT: ICD-10-CM

## 2022-09-16 DIAGNOSIS — E66.812 CLASS 2 SEVERE OBESITY WITH SERIOUS COMORBIDITY AND BODY MASS INDEX (BMI) OF 35.0 TO 35.9 IN ADULT, UNSPECIFIED OBESITY TYPE (H): ICD-10-CM

## 2022-09-16 DIAGNOSIS — E78.5 DYSLIPIDEMIA: ICD-10-CM

## 2022-09-16 DIAGNOSIS — K59.01 SLOW TRANSIT CONSTIPATION: ICD-10-CM

## 2022-09-16 DIAGNOSIS — F41.8 DEPRESSION WITH ANXIETY: ICD-10-CM

## 2022-09-16 DIAGNOSIS — I10 ESSENTIAL HYPERTENSION: ICD-10-CM

## 2022-09-16 PROCEDURE — 99310 SBSQ NF CARE HIGH MDM 45: CPT | Performed by: NURSE PRACTITIONER

## 2022-09-16 PROCEDURE — 36415 COLL VENOUS BLD VENIPUNCTURE: CPT | Performed by: NURSE PRACTITIONER

## 2022-09-16 PROCEDURE — 86481 TB AG RESPONSE T-CELL SUSP: CPT | Performed by: NURSE PRACTITIONER

## 2022-09-16 RX ORDER — AMOXICILLIN 250 MG
2 CAPSULE ORAL 2 TIMES DAILY
Qty: 30 TABLET | Refills: 0
Start: 2022-09-16

## 2022-09-16 RX ORDER — POLYETHYLENE GLYCOL 3350 17 G/17G
17 POWDER, FOR SOLUTION ORAL 2 TIMES DAILY
Qty: 510 G
Start: 2022-09-16

## 2022-09-16 NOTE — LETTER
9/16/2022        RE: Erin Ott  [address] [address]        [facility] GERIATRICS    PRIMARY CARE PROVIDER AND CLINIC:  Jhon Lee MD, [address] [address] / [place] [address]  Chief Complaint   Patient presents with     Hospital F/U      [place] Medical Record Number:  [id number]  Place of Service where encounter took place:  [facility]  ([facility]) [[id number]]    Erin Ott  is a 72 year old  (1950), admitted to the above facility from  [facility]. Hospital stay 9/12/2022 through 9/15/2022..   HPI:    PMH significant for type 2 diabetes, hypertension, dyslipidemia, restless leg syndrome, MARIOLA, depression with anxiety GERD, obesity.    Summary of recent hospitalization:   Erin Ott was hospitalized at [facility] from 9/12-9/15/2022 for elective knee replacement for osteoarthritis. She is s/p p left knee unicompartmental knee arthroplasty and Open reduction and internal fixation of left tibial eminence fracture on 9/12/2022. With drop in hemoglobin post operatively.Hemoglobin 12.0 on 9/12/2022, prior to surgery--> 10.2 post op. Hospitalization otherwise uncomplicated. Discharged to TCU for physical rehabilitation and medical management.     Erin was seen today for admission visit at the TCU.  She is sitting up in bed.  She tells me currently her pain is 2-3/10 while resting.  Pain has been 10/10 at its worst.  She denies shortness of breath, chest pain, dizziness.  She tells me she has not had a bowel movement since the surgery.  She does take scheduled bowel medications at home on a regular basis for constipation.  She denies abdominal pain.  She denies dysuria/trouble voiding.  She lives in a [facility] by herself.  We discussed today what to expect while in the TCU.    CODE STATUS/ADVANCE DIRECTIVES DISCUSSION:  CPR/Full code   ALLERGIES:   Allergies   Allergen Reactions     Latex Itching     Melatonin Other (See  Comments)     Chills     Adhesive Tape Rash     Ak-Mycin [Erythromycin] Rash      PAST MEDICAL HISTORY:   Past Medical History:   Diagnosis Date     Arthritis      Depression      Diabetes (H)      Gastroesophageal reflux disease      Hypertension      Obese      PONV (postoperative nausea and vomiting)       PAST SURGICAL HISTORY:   has a past surgical history that includes orthopedic surgery (Right, 2015); KNEE SCOPE,MED OR LAT MENIS REPAIR (Left, 2022); and Arthroplasty knee unicompartment (Left, 9/12/2022).  FAMILY HISTORY: family history is not on file.  SOCIAL HISTORY:   reports that she has never smoked. She has never used smokeless tobacco. She reports current alcohol use. She reports that she does not use drugs.  Patient's living condition: lives alone    Post Discharge Medication Reconciliation Status:     Post Medication Reconciliation Status: Discharge medications reconciled and changed, see notes/orders       Current Outpatient Medications   Medication Sig     acetaminophen (TYLENOL) 325 MG tablet Take 2 tablets (650 mg) by mouth every 4 hours as needed for other (mild pain)     aspirin (ASA) 325 MG EC tablet Take 1 tablet (325 mg) by mouth 2 times daily for 35 days     atorvastatin (LIPITOR) 80 MG tablet Take 80 mg by mouth daily     Calcium Carb-Cholecalciferol (CALCIUM + D3 PO) Take by mouth daily     citalopram (CELEXA) 20 MG tablet Take 20 mg by mouth daily     glipiZIDE (GLUCOTROL) 5 MG tablet Take 2.5 mg by mouth 2 times daily (before meals) Take one half tablet in the morning and one half tab in the evening     lisinopril (ZESTRIL) 5 MG tablet Take 5 mg by mouth daily     metFORMIN (GLUCOPHAGE) 500 MG tablet Take 1,000 mg by mouth 2 times daily (with meals)     omeprazole (PRILOSEC) 20 MG DR capsule Take 20 mg by mouth daily     oxyCODONE (ROXICODONE) 5 MG tablet Take 1-2 tablets (5-10 mg) by mouth every 4 hours as needed for moderate to severe pain     polyethylene glycol (MIRALAX) 17 GM/Dose  "powder Take 17 g by mouth 2 times daily     senna-docusate (SENOKOT-S/PERICOLACE) 8.6-50 MG tablet Take 2 tablets by mouth 2 times daily Take while on oral narcotics to prevent or treat constipation.     Cyanocobalamin (B-12 PO) Take by mouth daily     VITAMIN E PO Take by mouth daily (Patient not taking: Reported on 9/16/2022)     No current facility-administered medications for this visit.       ROS:  10 point ROS of systems including Constitutional, Eyes, Respiratory, Cardiovascular, Gastroenterology, Genitourinary, Integumentary, Musculoskeletal, Psychiatric were all negative except for pertinent positives noted in my HPI.    Vitals:  /67   Pulse 76   Temp 97.4  F (36.3  C)   Resp 16   Ht 1.651 m (5' 5\")   Wt 95.3 kg (210 lb)   SpO2 95%   BMI 34.95 kg/m    Exam:  GENERAL APPEARANCE:  Alert, in NAD  HEENT: normocephalic, moist mucous membranes, nose without drainage or crusting  RESP:  respiratory effort normal, no respiratory distress, Lung sounds clear, patient is on RA  CV: auscultation of heart done, rate and rhythm regular.   ABDOMEN: + bowel sounds, soft, nontender, no grimacing or guarding with palpation.  M/S: generalized edema around the left knee  SKIN:  Inspection and palpation of skin and subcutaneous tissue: skin warm, dry without rashes; incision with surgical glue in place- incision is C/D/I, also with some bruising to left knee and shin  NEURO: cranial nerves 2-12 grossly intact and at patient's baseline; moves extremities freely  PSYCH: oriented x 3, affect and mood ok    Lab/Diagnostic data:  Labs done in SNF are in [facility]. Please refer to them using EPIC/Care Everywhere. and Recent labs in EPIC reviewed by me today.     ASSESSMENT/PLAN:  (M17.12) Osteoarthritis of left knee, unspecified osteoarthritis type  (primary encounter diagnosis)  (Z96.652) S/P left unicompartmental knee replacement  (Z98.890,  Z87.81) S/P ORIF (open reduction internal fixation) fracture  Comment: s/p " "left knee unicompartmental knee arthroplasty and Open reduction and internal fixation of left tibial eminence fracture on 9/12/2022  -continues to have severe pain at times  Plan: Continue pain management with Tylenol 650 mg p.o. every 4 hours as needed, oxycodone 5 to 10 mg every 4 hours as needed.  Continue aspirin 325 mg p.o. twice daily through 10/20/2022. Continue incentive spirometer.  Weightbearing as tolerated.  Follow-up with Ortho per recommendations-asked facility to assist in scheduling.    (D62) Acute blood loss anemia  Comment: Secondary to surgery  -Hemoglobin 12.0 on 9/12/2022, prior to surgery  -Hemoglobin 10.2 on 9/14/2022  Plan: CBC 9/19.    (E11.9) Type 2 diabetes mellitus without complication, without long-term current use of insulin (H)  Comment: Chronic  -Hemoglobin A1c 7.2 on 9/13/2022  -Only 1 blood sugar available since admission, which is 156  Plan: Continue glipizide 2.5 mg twice daily, metformin 1000 mg twice daily.  Blood sugar checks 4 times daily for now, can reduce if controlled.  Carb controlled diet.    (I10) Essential hypertension  Comment: Chronic  -BP controlled 125/67, 126/70, 124/69; HR 76, 78, 80  Plan: Continue lisinopril 5 mg p.o. daily. BMP 9/19. VS per policy. Adjust medications as needed.    (E78.5) Dyslipidemia  Comment: Chronic  Plan: Continue atorvastatin 80 mg at bedtime.    (N18.2) CKD (chronic kidney disease) stage 2, GFR 60-89 ml/min- stage 3  Comment: Chronic  -Creatinine 1.03 on 9/12/2022  Plan: BMP 9/19. Avoid nephrotoxins. Renally dose medications as indicated.    (F41.8) Depression with anxiety  Comment: Chronic, mood today seems okay  Plan: Continue citalopram 20 mg daily.  Monitor mood and symptoms.  ACP referral as needed.    (K59.01) Slow transit constipation  Comment: has not had BM since surgery, also uses miralax daily and \"stool softener\" daily at home  -secondary to surgery, narcotics  Plan: STAT miralax and schedule BID. Continue senna S 2 tabs BID. " "If no BM today, administer suppository per JOSE EDUARDO orders tomorrow. Monitor bowels closely.     (K21.9) Gastroesophageal reflux disease, unspecified whether esophagitis present  Comment: chronic  Plan: Continue omeprazole 20 mg daily.    (E66.01,  Z68.35) Class 2 severe obesity with serious comorbidity and body mass index (BMI) of 35.0 to 35.9 in adult, unspecified obesity type (H)  Comment: chronic  Estimated body mass index is 34.95 kg/m  as calculated from the following:    Height as of this encounter: 1.651 m (5' 5\").    Weight as of this encounter: 95.3 kg (210 lb).    Plan: Encourage weight loss. Follow up with outpatient    (R53.81) Physical deconditioning  Comment: Acute, secondary to recent hospitalization, medical conditions as above  Plan: Encourage participation in physical therapy/occupational therapy for strengthening and deconditioning. Discharge planning per their recommendation. Social work to assist with d/c planning.        Total time spent with patient visit at the skilled nursing facility was 36 minutes including patient visit and review of past records. Greater than 50% of total time spent with counseling and coordinating care with facility staff regarding admission and medication orders, plan of care as described above. Counseling patient about current medications, plan of care and what to expect at [facility]  .       Disclaimer: This note may contain text created using speech-recognition software and may contain unintended word substitutions.       Electronically signed by:  BALTA Caruso CNP           Sincerely,        BALTA Caruso CNP    "

## 2022-09-16 NOTE — PROGRESS NOTES
[facility] GERIATRICS    PRIMARY CARE PROVIDER AND CLINIC:  Jhon Lee MD, [address] [address] / [place] [address]  Chief Complaint   Patient presents with     Hospital F/U      [place] Medical Record Number:  [id number]  Place of Service where encounter took place:  [facility]  (U) [[id number]]    Erin Ott  is a 72 year old  (1950), admitted to the above facility from  [facility]. Hospital stay 9/12/2022 through 9/15/2022..   HPI:    PMH significant for type 2 diabetes, hypertension, dyslipidemia, restless leg syndrome, MARIOLA, depression with anxiety GERD, obesity.    Summary of recent hospitalization:   Erin Ott was hospitalized at [facility] from 9/12-9/15/2022 for elective knee replacement for osteoarthritis. She is s/p p left knee unicompartmental knee arthroplasty and Open reduction and internal fixation of left tibial eminence fracture on 9/12/2022. With drop in hemoglobin post operatively.Hemoglobin 12.0 on 9/12/2022, prior to surgery--> 10.2 post op. Hospitalization otherwise uncomplicated. Discharged to TCU for physical rehabilitation and medical management.     Erin was seen today for admission visit at the TCU.  She is sitting up in bed.  She tells me currently her pain is 2-3/10 while resting.  Pain has been 10/10 at its worst.  She denies shortness of breath, chest pain, dizziness.  She tells me she has not had a bowel movement since the surgery.  She does take scheduled bowel medications at home on a regular basis for constipation.  She denies abdominal pain.  She denies dysuria/trouble voiding.  She lives in a [facility] by herself.  We discussed today what to expect while in the TCU.    CODE STATUS/ADVANCE DIRECTIVES DISCUSSION:  CPR/Full code   ALLERGIES:   Allergies   Allergen Reactions     Latex Itching     Melatonin Other (See Comments)     Chills     Adhesive Tape Rash     Ak-Mycin [Erythromycin] Rash      PAST  MEDICAL HISTORY:   Past Medical History:   Diagnosis Date     Arthritis      Depression      Diabetes (H)      Gastroesophageal reflux disease      Hypertension      Obese      PONV (postoperative nausea and vomiting)       PAST SURGICAL HISTORY:   has a past surgical history that includes orthopedic surgery (Right, 2015); KNEE SCOPE,MED OR LAT MENIS REPAIR (Left, 2022); and Arthroplasty knee unicompartment (Left, 9/12/2022).  FAMILY HISTORY: family history is not on file.  SOCIAL HISTORY:   reports that she has never smoked. She has never used smokeless tobacco. She reports current alcohol use. She reports that she does not use drugs.  Patient's living condition: lives alone    Post Discharge Medication Reconciliation Status:     Post Medication Reconciliation Status: Discharge medications reconciled and changed, see notes/orders       Current Outpatient Medications   Medication Sig     acetaminophen (TYLENOL) 325 MG tablet Take 2 tablets (650 mg) by mouth every 4 hours as needed for other (mild pain)     aspirin (ASA) 325 MG EC tablet Take 1 tablet (325 mg) by mouth 2 times daily for 35 days     atorvastatin (LIPITOR) 80 MG tablet Take 80 mg by mouth daily     Calcium Carb-Cholecalciferol (CALCIUM + D3 PO) Take by mouth daily     citalopram (CELEXA) 20 MG tablet Take 20 mg by mouth daily     glipiZIDE (GLUCOTROL) 5 MG tablet Take 2.5 mg by mouth 2 times daily (before meals) Take one half tablet in the morning and one half tab in the evening     lisinopril (ZESTRIL) 5 MG tablet Take 5 mg by mouth daily     metFORMIN (GLUCOPHAGE) 500 MG tablet Take 1,000 mg by mouth 2 times daily (with meals)     omeprazole (PRILOSEC) 20 MG DR capsule Take 20 mg by mouth daily     oxyCODONE (ROXICODONE) 5 MG tablet Take 1-2 tablets (5-10 mg) by mouth every 4 hours as needed for moderate to severe pain     polyethylene glycol (MIRALAX) 17 GM/Dose powder Take 17 g by mouth 2 times daily     senna-docusate (SENOKOT-S/PERICOLACE) 8.6-50  "MG tablet Take 2 tablets by mouth 2 times daily Take while on oral narcotics to prevent or treat constipation.     Cyanocobalamin (B-12 PO) Take by mouth daily     VITAMIN E PO Take by mouth daily (Patient not taking: Reported on 9/16/2022)     No current facility-administered medications for this visit.       ROS:  10 point ROS of systems including Constitutional, Eyes, Respiratory, Cardiovascular, Gastroenterology, Genitourinary, Integumentary, Musculoskeletal, Psychiatric were all negative except for pertinent positives noted in my HPI.    Vitals:  /67   Pulse 76   Temp 97.4  F (36.3  C)   Resp 16   Ht 1.651 m (5' 5\")   Wt 95.3 kg (210 lb)   SpO2 95%   BMI 34.95 kg/m    Exam:  GENERAL APPEARANCE:  Alert, in NAD  HEENT: normocephalic, moist mucous membranes, nose without drainage or crusting  RESP:  respiratory effort normal, no respiratory distress, Lung sounds clear, patient is on RA  CV: auscultation of heart done, rate and rhythm regular.   ABDOMEN: + bowel sounds, soft, nontender, no grimacing or guarding with palpation.  M/S: generalized edema around the left knee  SKIN:  Inspection and palpation of skin and subcutaneous tissue: skin warm, dry without rashes; incision with surgical glue in place- incision is C/D/I, also with some bruising to left knee and shin  NEURO: cranial nerves 2-12 grossly intact and at patient's baseline; moves extremities freely  PSYCH: oriented x 3, affect and mood ok    Lab/Diagnostic data:  Labs done in SNF are in [facility]. Please refer to them using [facility]/Care Everywhere. and Recent labs in [facility] reviewed by me today.     ASSESSMENT/PLAN:  (M17.12) Osteoarthritis of left knee, unspecified osteoarthritis type  (primary encounter diagnosis)  (Z96.652) S/P left unicompartmental knee replacement  (Z98.890,  Z87.81) S/P ORIF (open reduction internal fixation) fracture  Comment: s/p left knee unicompartmental knee arthroplasty and Open reduction and internal fixation of " "left tibial eminence fracture on 9/12/2022  -continues to have severe pain at times  Plan: Continue pain management with Tylenol 650 mg p.o. every 4 hours as needed, oxycodone 5 to 10 mg every 4 hours as needed.  Continue aspirin 325 mg p.o. twice daily through 10/20/2022. Continue incentive spirometer.  Weightbearing as tolerated.  Follow-up with Ortho per recommendations-asked facility to assist in scheduling.    (D62) Acute blood loss anemia  Comment: Secondary to surgery  -Hemoglobin 12.0 on 9/12/2022, prior to surgery  -Hemoglobin 10.2 on 9/14/2022  Plan: CBC 9/19.    (E11.9) Type 2 diabetes mellitus without complication, without long-term current use of insulin (H)  Comment: Chronic  -Hemoglobin A1c 7.2 on 9/13/2022  -Only 1 blood sugar available since admission, which is 156  Plan: Continue glipizide 2.5 mg twice daily, metformin 1000 mg twice daily.  Blood sugar checks 4 times daily for now, can reduce if controlled.  Carb controlled diet.    (I10) Essential hypertension  Comment: Chronic  -BP controlled 125/67, 126/70, 124/69; HR 76, 78, 80  Plan: Continue lisinopril 5 mg p.o. daily. BMP 9/19. VS per policy. Adjust medications as needed.    (E78.5) Dyslipidemia  Comment: Chronic  Plan: Continue atorvastatin 80 mg at bedtime.    (N18.2) CKD (chronic kidney disease) stage 2, GFR 60-89 ml/min- stage 3  Comment: Chronic  -Creatinine 1.03 on 9/12/2022  Plan: BMP 9/19. Avoid nephrotoxins. Renally dose medications as indicated.    (F41.8) Depression with anxiety  Comment: Chronic, mood today seems okay  Plan: Continue citalopram 20 mg daily.  Monitor mood and symptoms.  ACP referral as needed.    (K59.01) Slow transit constipation  Comment: has not had BM since surgery, also uses miralax daily and \"stool softener\" daily at home  -secondary to surgery, narcotics  Plan: STAT miralax and schedule BID. Continue senna S 2 tabs BID. If no BM today, administer suppository per JOSE EDUARDO orders tomorrow. Monitor bowels closely. " "    (K21.9) Gastroesophageal reflux disease, unspecified whether esophagitis present  Comment: chronic  Plan: Continue omeprazole 20 mg daily.    (E66.01,  Z68.35) Class 2 severe obesity with serious comorbidity and body mass index (BMI) of 35.0 to 35.9 in adult, unspecified obesity type (H)  Comment: chronic  Estimated body mass index is 34.95 kg/m  as calculated from the following:    Height as of this encounter: 1.651 m (5' 5\").    Weight as of this encounter: 95.3 kg (210 lb).    Plan: Encourage weight loss. Follow up with outpatient    (R53.81) Physical deconditioning  Comment: Acute, secondary to recent hospitalization, medical conditions as above  Plan: Encourage participation in physical therapy/occupational therapy for strengthening and deconditioning. Discharge planning per their recommendation. Social work to assist with d/c planning.        Total time spent with patient visit at the skilled nursing facility was 36 minutes including patient visit and review of past records. Greater than 50% of total time spent with counseling and coordinating care with facility staff regarding admission and medication orders, plan of care as described above. Counseling patient about current medications, plan of care and what to expect at [facility].  .       Disclaimer: This note may contain text created using speech-recognition software and may contain unintended word substitutions.       Electronically signed by:  BALTA Caruso CNP     "

## 2022-09-16 NOTE — PATIENT INSTRUCTIONS
Orders  Erin Ott  1950  1) BMP, CBC 9/19. Diagnosis: HTN, anemia  2) Carb controlled diet.  3) BS QID for now  4) Please add wound care/dressing orders as on discharge orders  5) Please add no soaking orders as on discharge orders  6) Please assist patient in scheduling ortho follow up   7) Hold vitamin E in TCU  8) OK to use CPAP mouth appliance when sleeping.  9) Miralax 17 gram po BID for constipation- first dose STAT  10) If no BM today, administer suppository per JOSE EDUARDO orders tomorrow. Diagnosis: constipation  BALTA Caruso CNP on 9/16/2022 at 1:01 PM

## 2022-09-16 NOTE — PROGRESS NOTES
[facility]  INITIAL VISIT NOTE  September 19, 2022    PRIMARY CARE PROVIDER AND CLINIC:  Jhon Lee [address] [address] / [place] [address]    CHIEF COMPLAINT:  Hospital follow-up/Initial visit    HPI:    Erin Ott is a 72 year old  (1950) female who was seen at [facility] on September 19, 2022 for an initial visit.     Medical history is notable for hypertension, dyslipidemia, DM type II, CKD, GERD, shingles, depression, anxiety, RLS, obesity, and osteoarthritis.    Summary of hospital course:  Patient was hospitalized at [facility] from September 12 through September 15, 2022 for elective left knee surgery.  She underwent left knee unicompartmental knee arthroplasty as well as ORIF for intraoperative left tibial eminence fracture on September 12, 2022.  EBL was less than 10 mL but hemoglobin postop dropped to 9.9 from initial 12.  She was started on aspirin for postop DVT prophylaxis.  TCU was recommended per therapies.    Patient is admitted to this facility for medical management, nursing care, and rehab.     Of note, history was obtained from patient, facility RN, and extensive review of the chart.    Today's visit:  Patient was seen in her room, while sitting on the edge of the bed.  She appears comfortable although she complains of left knee pain at 7-8 out of 10.  She also endorses left knee swelling since knee surgery.  She denies fever, chills, chest pain, palpitation, dyspnea, nausea, vomiting, abdominal pain, or urinary symptoms.  She had 2 bowel movements yesterday.      CODE STATUS:   CPR/Full code     PAST MEDICAL HISTORY:   Left knee osteoarthritis, s/p left medial unicompartmental knee arthroplasty and ORIF (intramedullary) for intraoperative left tibial eminence fracture, on September 12, 2022  Hypertension  Dyslipidemia  DM type II  CKD stage IIIa, baseline creatinine around 1.1  GERD  Abnormal  LFTs  Shingles  Depression  Anxiety  RLS  Obesity, BMI 35    Note: No asthma or MARIOLA per patient    Past Medical History:   Diagnosis Date     Arthritis      Depression      Diabetes (H)      Gastroesophageal reflux disease      Hypertension      Obese      PONV (postoperative nausea and vomiting)        PAST SURGICAL HISTORY:   Past Surgical History:   Procedure Laterality Date     ARTHROPLASTY KNEE UNICOMPARTMENT Left 9/12/2022    Procedure: 1.  Left knee unicompartmental knee arthroplasty using Arthrex medial compartment unicompartmental knee arthroplasty.   2.  Open reduction and internal fixation of left tibial eminence fracture.;  Surgeon: Chester Rodriguez MD;  Location: [facility] OR     HC KNEE ARTHROSCOPY, MED/LAT MENISCUS REPAIR Left 2022     ORTHOPEDIC SURGERY Right 2015    partial knee replacement       FAMILY HISTORY:   Family history is significant for CAD (s/p CABG), dyslipidemia, and diabetes in her father, leukemia in her maternal aunt 1, breast cancer in her maternal aunt 2, stroke in her maternal grandmother and maternal uncle, and breast cancer, hypertension, and diabetes in her mother    SOCIAL HISTORY:  Social History     Tobacco Use     Smoking status: Never Smoker     Smokeless tobacco: Never Used   Substance Use Topics     Alcohol use: Yes     Comment: rarely       MEDICATIONS:  Current Outpatient Medications   Medication Sig Dispense Refill     acetaminophen (TYLENOL) 325 MG tablet Take 2 tablets (650 mg) by mouth every 4 hours as needed for other (mild pain) 100 tablet 0     aspirin (ASA) 325 MG EC tablet Take 1 tablet (325 mg) by mouth 2 times daily for 35 days 70 tablet 0     atorvastatin (LIPITOR) 80 MG tablet Take 80 mg by mouth daily       Calcium Carb-Cholecalciferol (CALCIUM + D3 PO) Take by mouth daily       citalopram (CELEXA) 20 MG tablet Take 20 mg by mouth daily       Cyanocobalamin (B-12 PO) Take by mouth daily       glipiZIDE (GLUCOTROL) 5 MG tablet Take 2.5 mg by mouth 2  "times daily (before meals) Take one half tablet in the morning and one half tab in the evening       lisinopril (ZESTRIL) 5 MG tablet Take 5 mg by mouth daily       metFORMIN (GLUCOPHAGE) 500 MG tablet Take 1,000 mg by mouth 2 times daily (with meals)       omeprazole (PRILOSEC) 20 MG DR capsule Take 20 mg by mouth daily       oxyCODONE (ROXICODONE) 5 MG tablet Take 1-2 tablets (5-10 mg) by mouth every 4 hours as needed for moderate to severe pain 30 tablet 0     senna-docusate (SENOKOT-S/PERICOLACE) 8.6-50 MG tablet Take 1-2 tablets by mouth 2 times daily Take while on oral narcotics to prevent or treat constipation. 30 tablet 0     VITAMIN E PO Take by mouth daily         Post Medication Reconciliation Status: Previously reconciled during another visit; continue medications without change.      ALLERGIES:  Allergies   Allergen Reactions     Latex Itching     Melatonin Other (See Comments)     Chills     Adhesive Tape Rash     Ak-Mycin [Erythromycin] Rash       ROS:  10 point ROS were negative other than the symptoms noted above in the HPI.    PHYSICAL EXAM:  Vital signs were reviewed in the chart.  Vital Signs: BP (!) 140/82   Pulse 86   Temp 97.9  F (36.6  C)   Resp 16   Ht 1.651 m (5' 5\")   Wt 98.4 kg (217 lb)   SpO2 95%   BMI 36.11 kg/m    General: Comfortable and in no acute distress  HEENT: Conjunctival pallor, no scleral icterus or injection, moist oral mucosa  Cardiovascular: Normal S1, S2, RRR  Respiratory: Lungs clear to auscultation bilaterally  GI: Abdomen soft, non-tender, non-distended, +BS  Extremities: Mild left knee swelling, no calf tenderness  Neuro: CX II-XII grossly intact; ROM in all four extremities grossly intact  Psych: Alert and oriented x3; normal affect  Skin: Left knee surgical wound is clean    LABORATORY/IMAGING DATA:  All relevant labs and imaging data in [facility] and/or Care Everywhere were personally reviewed today.      Most Recent 3 CBC's:Recent Labs   Lab Test 09/15/22  0622 " 09/14/22  0715 09/13/22  0658 09/12/22  0829   HGB  --  10.2* 9.9* 12.0     --  313  --      Most Recent 3 BMP's:Recent Labs   Lab Test 09/15/22  1051 09/15/22  0631 09/15/22  0200 09/12/22  1739 09/12/22  1636 09/12/22  0832 09/12/22  0829   POTASSIUM  --   --   --   --   --   --  4.1   CR  --   --   --   --  1.03*  --  1.13*   GLC 99 124* 136*   < >  --    < >  --     < > = values in this interval not displayed.         ASSESSMENT/PLAN:  Left knee osteoarthritis, s/p left medial unicompartmental knee arthroplasty on September 12, 2022,  Intraoperative left tibial eminence fracture, s/p ORIF on September 12, 2022,  Physical deconditioning.  Patient is hemodynamically stable.  Patient currently has moderate surgical pain as well as left knee swelling.  Plan:  Continue pain management with PRN acetaminophen and oxycodone  Continue DVT prophylaxis with aspirin 325 mg twice daily through October 20, 2022  WBAT LLE  Continue PT/OT evaluation and therapy  Lower extremity Doppler study if LLE swelling worsens, to rule out DVT  Follow-up with Ortho as directed    Acute blood loss anemia.  Due to orthopedic surgery.  Hemoglobin dropped to 9.9 from initial 12.  Last hemoglobin was 10.2 on September 14.  Plan:  Monitor hemoglobin periodically  Transfuse PRN for hemoglobin less than 7    Essential hypertension.  Blood pressure is fairly controlled.  Plan:  Continue lisinopril 5 mg daily  Continue to monitor blood pressure per TCU routine    Dyslipidemia.  Plan:  Continue atorvastatin 80 mg daily    DM type II.  Last hemoglobin A1c was 7.2% on September 13, 2022.  Blood glucose levels are in the range of .  Plan:  Continue diabetic diet  Continue glipizide 2.5 mg twice daily  Continue metformin 1000 mg twice daily  Change blood glucose monitoring to twice a day    CKD stage IIIa.  Baseline creatinine around 1.1.  Last creatinine was 1.03 on September 13.  Plan:  Avoid NSAIDs and nephrotoxins  Monitor renal function  periodically    GERD.  Plan:  Continue omeprazole 20 mg daily    History of abnormal LFTs.  Last LFT in April 2018 with ALT of 69, AST of 43, total bili of 0.2, and alkaline phosphatase of 99.  Plan:  Follow as outpatient    Depression,  Anxiety.  Appears stable.  Plan:  Continue citalopram 20 mg daily  Monitor symptoms    Slow transit constipation.  Plan:  Continue the bowel regimen    Obesity, BMI 35.  Plan:  Staff to assist with daily care and mobility        Orders written by provider at facility:  Change blood glucose monitoring to twice a day, DX: DM type II        Recommendation by provider at facility:  Follow-up on CBC and BMP from today, September 19  Lower extremity Doppler study if LLE swelling worsens, to rule out DVT        Disclaimer: This note may contain text created using speech-recognition software and may contain unintended word substitutions.      Electronically signed by:  Kalani Dickson MD

## 2022-09-18 ENCOUNTER — LAB REQUISITION (OUTPATIENT)
Dept: LAB | Facility: CLINIC | Age: 72
End: 2022-09-18

## 2022-09-18 DIAGNOSIS — D64.9 ANEMIA, UNSPECIFIED: ICD-10-CM

## 2022-09-18 DIAGNOSIS — I10 ESSENTIAL (PRIMARY) HYPERTENSION: ICD-10-CM

## 2022-09-18 LAB
QUANTIFERON MITOGEN: 5.76 IU/ML
QUANTIFERON NIL TUBE: 0.06 IU/ML
QUANTIFERON TB1 TUBE: 0.05 IU/ML
QUANTIFERON TB2 TUBE: 0.06

## 2022-09-19 ENCOUNTER — TRANSITIONAL CARE UNIT VISIT (OUTPATIENT)
Dept: GERIATRICS | Facility: CLINIC | Age: 72
End: 2022-09-19
Payer: COMMERCIAL

## 2022-09-19 VITALS
RESPIRATION RATE: 16 BRPM | DIASTOLIC BLOOD PRESSURE: 82 MMHG | HEIGHT: 65 IN | TEMPERATURE: 97.9 F | HEART RATE: 86 BPM | BODY MASS INDEX: 36.15 KG/M2 | OXYGEN SATURATION: 95 % | WEIGHT: 217 LBS | SYSTOLIC BLOOD PRESSURE: 140 MMHG

## 2022-09-19 DIAGNOSIS — I10 ESSENTIAL HYPERTENSION: ICD-10-CM

## 2022-09-19 DIAGNOSIS — E78.5 DYSLIPIDEMIA: ICD-10-CM

## 2022-09-19 DIAGNOSIS — E11.9 TYPE 2 DIABETES MELLITUS WITHOUT COMPLICATION, WITHOUT LONG-TERM CURRENT USE OF INSULIN (H): ICD-10-CM

## 2022-09-19 DIAGNOSIS — Z96.652 S/P LEFT UNICOMPARTMENTAL KNEE REPLACEMENT: ICD-10-CM

## 2022-09-19 DIAGNOSIS — D62 ACUTE BLOOD LOSS ANEMIA: ICD-10-CM

## 2022-09-19 DIAGNOSIS — Z98.890 S/P ORIF (OPEN REDUCTION INTERNAL FIXATION) FRACTURE: ICD-10-CM

## 2022-09-19 DIAGNOSIS — E66.812 CLASS 2 SEVERE OBESITY WITH SERIOUS COMORBIDITY AND BODY MASS INDEX (BMI) OF 35.0 TO 35.9 IN ADULT, UNSPECIFIED OBESITY TYPE (H): ICD-10-CM

## 2022-09-19 DIAGNOSIS — F41.8 DEPRESSION WITH ANXIETY: ICD-10-CM

## 2022-09-19 DIAGNOSIS — M17.12 OSTEOARTHRITIS OF LEFT KNEE, UNSPECIFIED OSTEOARTHRITIS TYPE: Primary | ICD-10-CM

## 2022-09-19 DIAGNOSIS — E66.01 CLASS 2 SEVERE OBESITY WITH SERIOUS COMORBIDITY AND BODY MASS INDEX (BMI) OF 35.0 TO 35.9 IN ADULT, UNSPECIFIED OBESITY TYPE (H): ICD-10-CM

## 2022-09-19 DIAGNOSIS — Z87.81 S/P ORIF (OPEN REDUCTION INTERNAL FIXATION) FRACTURE: ICD-10-CM

## 2022-09-19 DIAGNOSIS — K59.01 SLOW TRANSIT CONSTIPATION: ICD-10-CM

## 2022-09-19 DIAGNOSIS — N18.31 STAGE 3A CHRONIC KIDNEY DISEASE (H): ICD-10-CM

## 2022-09-19 LAB
ANION GAP SERPL CALCULATED.3IONS-SCNC: 15 MMOL/L (ref 7–15)
BUN SERPL-MCNC: 12.8 MG/DL (ref 8–23)
CALCIUM SERPL-MCNC: 9.8 MG/DL (ref 8.8–10.2)
CHLORIDE SERPL-SCNC: 99 MMOL/L (ref 98–107)
CREAT SERPL-MCNC: 1.05 MG/DL (ref 0.51–0.95)
DEPRECATED HCO3 PLAS-SCNC: 25 MMOL/L (ref 22–29)
ERYTHROCYTE [DISTWIDTH] IN BLOOD BY AUTOMATED COUNT: 16.1 % (ref 10–15)
GAMMA INTERFERON BACKGROUND BLD IA-ACNC: 0.06 IU/ML
GFR SERPL CREATININE-BSD FRML MDRD: 57 ML/MIN/1.73M2
GLUCOSE SERPL-MCNC: 101 MG/DL (ref 70–99)
HCT VFR BLD AUTO: 33.6 % (ref 35–47)
HGB BLD-MCNC: 10 G/DL (ref 11.7–15.7)
M TB IFN-G BLD-IMP: NEGATIVE
M TB IFN-G CD4+ BCKGRND COR BLD-ACNC: 5.7 IU/ML
MCH RBC QN AUTO: 27.3 PG (ref 26.5–33)
MCHC RBC AUTO-ENTMCNC: 29.8 G/DL (ref 31.5–36.5)
MCV RBC AUTO: 92 FL (ref 78–100)
MITOGEN IGNF BCKGRD COR BLD-ACNC: -0.01 IU/ML
MITOGEN IGNF BCKGRD COR BLD-ACNC: 0 IU/ML
PLATELET # BLD AUTO: 392 10E3/UL (ref 150–450)
POTASSIUM SERPL-SCNC: 4.1 MMOL/L (ref 3.4–5.3)
RBC # BLD AUTO: 3.66 10E6/UL (ref 3.8–5.2)
SODIUM SERPL-SCNC: 139 MMOL/L (ref 136–145)
WBC # BLD AUTO: 6.3 10E3/UL (ref 4–11)

## 2022-09-19 PROCEDURE — 80048 BASIC METABOLIC PNL TOTAL CA: CPT | Performed by: NURSE PRACTITIONER

## 2022-09-19 PROCEDURE — 85027 COMPLETE CBC AUTOMATED: CPT | Performed by: NURSE PRACTITIONER

## 2022-09-19 PROCEDURE — 99305 1ST NF CARE MODERATE MDM 35: CPT | Performed by: INTERNAL MEDICINE

## 2022-09-19 RX ORDER — OXYCODONE HYDROCHLORIDE 5 MG/1
5-10 TABLET ORAL EVERY 4 HOURS PRN
Qty: 30 TABLET | Refills: 0 | Status: SHIPPED | OUTPATIENT
Start: 2022-09-19 | End: 2022-09-21

## 2022-09-19 NOTE — PATIENT INSTRUCTIONS
Orders for rEin Ott (1950), MR# [id number]:      1) Change blood glucose monitoring to twice a day, DX: DM type II      Kalani Dickson MD  [facility]

## 2022-09-19 NOTE — LETTER
9/19/2022        RE: Erin Ott  [address] [address]        [facility]  INITIAL VISIT NOTE  September 19, 2022    PRIMARY CARE PROVIDER AND CLINIC:  Jhon Lee [address] / [place] [address]    CHIEF COMPLAINT:  Hospital follow-up/Initial visit    HPI:    Erin Ott is a 72 year old  (1950) female who was seen at [facility] on September 19, 2022 for an initial visit.     Medical history is notable for hypertension, dyslipidemia, DM type II, CKD, GERD, shingles, depression, anxiety, RLS, obesity, and osteoarthritis.    Summary of hospital course:  Patient was hospitalized at [facility] from September 12 through September 15, 2022 for elective left knee surgery.  She underwent left knee unicompartmental knee arthroplasty as well as ORIF for intraoperative left tibial eminence fracture on September 12, 2022.  EBL was less than 10 mL but hemoglobin postop dropped to 9.9 from initial 12.  She was started on aspirin for postop DVT prophylaxis.  TCU was recommended per therapies.    Patient is admitted to this facility for medical management, nursing care, and rehab.     Of note, history was obtained from patient, facility RN, and extensive review of the chart.    Today's visit:  Patient was seen in her room, while sitting on the edge of the bed.  She appears comfortable although she complains of left knee pain at 7-8 out of 10.  She also endorses left knee swelling since knee surgery.  She denies fever, chills, chest pain, palpitation, dyspnea, nausea, vomiting, abdominal pain, or urinary symptoms.  She had 2 bowel movements yesterday.      CODE STATUS:   CPR/Full code     PAST MEDICAL HISTORY:   Left knee osteoarthritis, s/p left medial unicompartmental knee arthroplasty and ORIF (intramedullary) for intraoperative left tibial eminence fracture, on September 12, 2022  Hypertension  Dyslipidemia  DM type II  CKD stage  IIIa, baseline creatinine around 1.1  GERD  Abnormal LFTs  Shingles  Depression  Anxiety  RLS  Obesity, BMI 35    Note: No asthma or MARIOLA per patient    Past Medical History:   Diagnosis Date     Arthritis      Depression      Diabetes (H)      Gastroesophageal reflux disease      Hypertension      Obese      PONV (postoperative nausea and vomiting)        PAST SURGICAL HISTORY:   Past Surgical History:   Procedure Laterality Date     ARTHROPLASTY KNEE UNICOMPARTMENT Left 9/12/2022    Procedure: 1.  Left knee unicompartmental knee arthroplasty using Arthrex medial compartment unicompartmental knee arthroplasty.   2.  Open reduction and internal fixation of left tibial eminence fracture.;  Surgeon: Chester Rodriguez MD;  Location: [facility] OR     HC KNEE ARTHROSCOPY, MED/LAT MENISCUS REPAIR Left 2022     ORTHOPEDIC SURGERY Right 2015    partial knee replacement       FAMILY HISTORY:   Family history is significant for CAD (s/p CABG), dyslipidemia, and diabetes in her father, leukemia in her maternal aunt 1, breast cancer in her maternal aunt 2, stroke in her maternal grandmother and maternal uncle, and breast cancer, hypertension, and diabetes in her mother    SOCIAL HISTORY:  Social History     Tobacco Use     Smoking status: Never Smoker     Smokeless tobacco: Never Used   Substance Use Topics     Alcohol use: Yes     Comment: rarely       MEDICATIONS:  Current Outpatient Medications   Medication Sig Dispense Refill     acetaminophen (TYLENOL) 325 MG tablet Take 2 tablets (650 mg) by mouth every 4 hours as needed for other (mild pain) 100 tablet 0     aspirin (ASA) 325 MG EC tablet Take 1 tablet (325 mg) by mouth 2 times daily for 35 days 70 tablet 0     atorvastatin (LIPITOR) 80 MG tablet Take 80 mg by mouth daily       Calcium Carb-Cholecalciferol (CALCIUM + D3 PO) Take by mouth daily       citalopram (CELEXA) 20 MG tablet Take 20 mg by mouth daily       Cyanocobalamin (B-12 PO) Take by mouth daily        "glipiZIDE (GLUCOTROL) 5 MG tablet Take 2.5 mg by mouth 2 times daily (before meals) Take one half tablet in the morning and one half tab in the evening       lisinopril (ZESTRIL) 5 MG tablet Take 5 mg by mouth daily       metFORMIN (GLUCOPHAGE) 500 MG tablet Take 1,000 mg by mouth 2 times daily (with meals)       omeprazole (PRILOSEC) 20 MG DR capsule Take 20 mg by mouth daily       oxyCODONE (ROXICODONE) 5 MG tablet Take 1-2 tablets (5-10 mg) by mouth every 4 hours as needed for moderate to severe pain 30 tablet 0     senna-docusate (SENOKOT-S/PERICOLACE) 8.6-50 MG tablet Take 1-2 tablets by mouth 2 times daily Take while on oral narcotics to prevent or treat constipation. 30 tablet 0     VITAMIN E PO Take by mouth daily         Post Medication Reconciliation Status: Previously reconciled during another visit; continue medications without change.      ALLERGIES:  Allergies   Allergen Reactions     Latex Itching     Melatonin Other (See Comments)     Chills     Adhesive Tape Rash     Ak-Mycin [Erythromycin] Rash       ROS:  10 point ROS were negative other than the symptoms noted above in the HPI.    PHYSICAL EXAM:  Vital signs were reviewed in the chart.  Vital Signs: BP (!) 140/82   Pulse 86   Temp 97.9  F (36.6  C)   Resp 16   Ht 1.651 m (5' 5\")   Wt 98.4 kg (217 lb)   SpO2 95%   BMI 36.11 kg/m    General: Comfortable and in no acute distress  HEENT: Conjunctival pallor, no scleral icterus or injection, moist oral mucosa  Cardiovascular: Normal S1, S2, RRR  Respiratory: Lungs clear to auscultation bilaterally  GI: Abdomen soft, non-tender, non-distended, +BS  Extremities: Mild left knee swelling, no calf tenderness  Neuro: CX II-XII grossly intact; ROM in all four extremities grossly intact  Psych: Alert and oriented x3; normal affect  Skin: Left knee surgical wound is clean    LABORATORY/IMAGING DATA:  All relevant labs and imaging data in [facility] and/or Care Everywhere were personally reviewed " today.      Most Recent 3 CBC's:Recent Labs   Lab Test 09/15/22  0622 09/14/22  0715 09/13/22  0658 09/12/22  0829   HGB  --  10.2* 9.9* 12.0     --  313  --      Most Recent 3 BMP's:Recent Labs   Lab Test 09/15/22  1051 09/15/22  0631 09/15/22  0200 09/12/22  1739 09/12/22  1636 09/12/22  0832 09/12/22  0829   POTASSIUM  --   --   --   --   --   --  4.1   CR  --   --   --   --  1.03*  --  1.13*   GLC 99 124* 136*   < >  --    < >  --     < > = values in this interval not displayed.         ASSESSMENT/PLAN:  Left knee osteoarthritis, s/p left medial unicompartmental knee arthroplasty on September 12, 2022,  Intraoperative left tibial eminence fracture, s/p ORIF on September 12, 2022,  Physical deconditioning.  Patient is hemodynamically stable.  Patient currently has moderate surgical pain as well as left knee swelling.  Plan:  Continue pain management with PRN acetaminophen and oxycodone  Continue DVT prophylaxis with aspirin 325 mg twice daily through October 20, 2022  WBAT LLE  Continue PT/OT evaluation and therapy  Lower extremity Doppler study if LLE swelling worsens, to rule out DVT  Follow-up with Ortho as directed    Acute blood loss anemia.  Due to orthopedic surgery.  Hemoglobin dropped to 9.9 from initial 12.  Last hemoglobin was 10.2 on September 14.  Plan:  Monitor hemoglobin periodically  Transfuse PRN for hemoglobin less than 7    Essential hypertension.  Blood pressure is fairly controlled.  Plan:  Continue lisinopril 5 mg daily  Continue to monitor blood pressure per TCU routine    Dyslipidemia.  Plan:  Continue atorvastatin 80 mg daily    DM type II.  Last hemoglobin A1c was 7.2% on September 13, 2022.  Blood glucose levels are in the range of .  Plan:  Continue diabetic diet  Continue glipizide 2.5 mg twice daily  Continue metformin 1000 mg twice daily  Change blood glucose monitoring to twice a day    CKD stage IIIa.  Baseline creatinine around 1.1.  Last creatinine was 1.03 on  September 13.  Plan:  Avoid NSAIDs and nephrotoxins  Monitor renal function periodically    GERD.  Plan:  Continue omeprazole 20 mg daily    History of abnormal LFTs.  Last LFT in April 2018 with ALT of 69, AST of 43, total bili of 0.2, and alkaline phosphatase of 99.  Plan:  Follow as outpatient    Depression,  Anxiety.  Appears stable.  Plan:  Continue citalopram 20 mg daily  Monitor symptoms    Slow transit constipation.  Plan:  Continue the bowel regimen    Obesity, BMI 35.  Plan:  Staff to assist with daily care and mobility        Orders written by provider at facility:  Change blood glucose monitoring to twice a day, DX: DM type II        Recommendation by provider at facility:  Follow-up on CBC and BMP from today, September 19  Lower extremity Doppler study if LLE swelling worsens, to rule out DVT        Disclaimer: This note may contain text created using speech-recognition software and may contain unintended word substitutions.      Electronically signed by:  Kalani Dickson MD                          Sincerely,        Kalani Dickson MD

## 2022-09-21 ENCOUNTER — TRANSITIONAL CARE UNIT VISIT (OUTPATIENT)
Dept: GERIATRICS | Facility: CLINIC | Age: 72
End: 2022-09-21
Payer: COMMERCIAL

## 2022-09-21 VITALS
SYSTOLIC BLOOD PRESSURE: 120 MMHG | WEIGHT: 217 LBS | TEMPERATURE: 98.6 F | DIASTOLIC BLOOD PRESSURE: 67 MMHG | BODY MASS INDEX: 36.15 KG/M2 | HEIGHT: 65 IN | RESPIRATION RATE: 16 BRPM | OXYGEN SATURATION: 94 % | HEART RATE: 84 BPM

## 2022-09-21 DIAGNOSIS — F41.8 DEPRESSION WITH ANXIETY: ICD-10-CM

## 2022-09-21 DIAGNOSIS — Z96.652 S/P LEFT UNICOMPARTMENTAL KNEE REPLACEMENT: ICD-10-CM

## 2022-09-21 DIAGNOSIS — I10 ESSENTIAL HYPERTENSION: ICD-10-CM

## 2022-09-21 DIAGNOSIS — N18.31 STAGE 3A CHRONIC KIDNEY DISEASE (H): ICD-10-CM

## 2022-09-21 DIAGNOSIS — Z87.81 S/P ORIF (OPEN REDUCTION INTERNAL FIXATION) FRACTURE: ICD-10-CM

## 2022-09-21 DIAGNOSIS — D62 ACUTE BLOOD LOSS ANEMIA: ICD-10-CM

## 2022-09-21 DIAGNOSIS — R53.81 PHYSICAL DECONDITIONING: ICD-10-CM

## 2022-09-21 DIAGNOSIS — E66.01 CLASS 2 SEVERE OBESITY WITH SERIOUS COMORBIDITY AND BODY MASS INDEX (BMI) OF 36.0 TO 36.9 IN ADULT, UNSPECIFIED OBESITY TYPE (H): ICD-10-CM

## 2022-09-21 DIAGNOSIS — E11.9 TYPE 2 DIABETES MELLITUS WITHOUT COMPLICATION, WITHOUT LONG-TERM CURRENT USE OF INSULIN (H): ICD-10-CM

## 2022-09-21 DIAGNOSIS — M17.12 OSTEOARTHRITIS OF LEFT KNEE, UNSPECIFIED OSTEOARTHRITIS TYPE: Primary | ICD-10-CM

## 2022-09-21 DIAGNOSIS — E78.5 DYSLIPIDEMIA: ICD-10-CM

## 2022-09-21 DIAGNOSIS — Z98.890 S/P ORIF (OPEN REDUCTION INTERNAL FIXATION) FRACTURE: ICD-10-CM

## 2022-09-21 DIAGNOSIS — K21.9 GASTROESOPHAGEAL REFLUX DISEASE, UNSPECIFIED WHETHER ESOPHAGITIS PRESENT: ICD-10-CM

## 2022-09-21 DIAGNOSIS — E66.812 CLASS 2 SEVERE OBESITY WITH SERIOUS COMORBIDITY AND BODY MASS INDEX (BMI) OF 36.0 TO 36.9 IN ADULT, UNSPECIFIED OBESITY TYPE (H): ICD-10-CM

## 2022-09-21 DIAGNOSIS — K59.01 SLOW TRANSIT CONSTIPATION: ICD-10-CM

## 2022-09-21 PROCEDURE — 99309 SBSQ NF CARE MODERATE MDM 30: CPT | Performed by: NURSE PRACTITIONER

## 2022-09-21 RX ORDER — OXYCODONE HYDROCHLORIDE 5 MG/1
5-10 TABLET ORAL EVERY 6 HOURS PRN
Qty: 30 TABLET | Refills: 0 | Status: SHIPPED | OUTPATIENT
Start: 2022-09-21

## 2022-09-21 RX ORDER — ACETAMINOPHEN 500 MG
1000 TABLET ORAL 3 TIMES DAILY
Start: 2022-09-21 | End: 2022-09-22

## 2022-09-21 NOTE — LETTER
9/21/2022        RE: Erin Ott  [address] [address]        [facility] GERIATRICS    Chief Complaint   Patient presents with     RECHECK     HPI:  Erin Ott is a 72 year old  (1950), who is being seen today for an episodic care visit at: [facility]  ([facility]) [[id number]].     PMH significant for type 2 diabetes, hypertension, dyslipidemia, restless leg syndrome, MARIOLA, depression with anxiety GERD, obesity.     Summary of recent hospitalization:   Erin Ott was hospitalized at [facility] from 9/12-9/15/2022 for elective knee replacement for osteoarthritis. She is s/p p left knee unicompartmental knee arthroplasty and Open reduction and internal fixation of left tibial eminence fracture on 9/12/2022. With drop in hemoglobin post operatively.Hemoglobin 12.0 on 9/12/2022, prior to surgery--> 10.2 post op. Hospitalization otherwise uncomplicated. [facility]charged to [facility] for physical rehabilitation and medical management.     Today's concern is: Erin was seen today for routine follow-up in the TCU.  She reports ongoing severe pain at times to her left knee.  She is using oxycodone around-the-clock.  She feels like it is helpful for her pain, but she is trying to avoid developing severe pain, which happened in the hospital.  She denies shortness of breath, chest pain, dizziness/lightheadedness.  She tells me her bowels are moving regularly.  Her appetite is good.  She continues to work with therapy.  She has questions today about how long she will be here, which I directed her to ask therapy and let therapy know she was wondering about this. Has follow up with ortho scheduled for 9/27 and needs transport arranged.    Allergies, and PMH/PSH reviewed in EPIC today.  REVIEW OF SYSTEMS:  10 point ROS of systems including Constitutional, Eyes, Respiratory, Cardiovascular, Gastroenterology, Genitourinary, Integumentary, Musculoskeletal, Psychiatric were all negative except  "for pertinent positives noted in my HPI.    Objective:   /67   Pulse 84   Temp 98.6  F (37  C)   Resp 16   Ht 1.651 m (5' 5\")   Wt 98.4 kg (217 lb)   SpO2 94%   BMI 36.11 kg/m    GENERAL APPEARANCE:  Alert, in NAD  HEENT: normocephalic, moist mucous membranes, nose without drainage or crusting  RESP:  respiratory effort normal, no respiratory distress, Lung sounds clear, patient is on RA  CV: auscultation of heart done, rate and rhythm regular.   ABDOMEN: + bowel sounds, soft, nontender, no grimacing or guarding with palpation.  M/S: generalized edema around the left knee  SKIN:  Inspection and palpation of skin and subcutaneous tissue: skin warm, dry without rashes; incision with surgical glue in place- incision is C/D/I, also with some bruising to left knee and shin  NEURO: cranial nerves 2-12 grossly intact and at patient's baseline; moves extremities freely  PSYCH: oriented x 3, affect and mood ok    [facility]. Please refer to them using [employer] Everywhere. and Recent labs in [facility] reviewed by me today.     Assessment/Plan:  (M17.12) Osteoarthritis of left knee, unspecified osteoarthritis type  (primary encounter diagnosis)  (Z96.652) S/P left unicompartmental knee replacement  (Z98.890,  Z87.81) S/P ORIF (open reduction internal fixation) fracture  Comment: s/p left knee unicompartmental knee arthroplasty and Open reduction and internal fixation of left tibial eminence fracture on 9/12/2022  -continues to have severe pain at times  -is using oxycodone around the clock  Plan: Continue pain management with Tylenol 1000 mg TID. She is agreeable to reducing oxycodone to 5 to 10 mg every 6 hours as needed. Continue aspirin 325 mg p.o. twice daily through 10/20/2022. Continue incentive spirometer.  Weightbearing as tolerated.  Follow-up with Ortho 9/27. Asked facility to arrange transport per her request.     (D62) Acute blood loss anemia  Comment: Secondary to " "surgery  -Hemoglobin 12.0 on 9/12/2022, prior to surgery  -Hemoglobin 10.0 on 9/19  Plan: CBC 9/26.     (E11.9) Type 2 diabetes mellitus without complication, without long-term current use of insulin (H)  Comment: Chronic  -Hemoglobin A1c 7.2 on 9/13/2022  BS   Plan: Continue glipizide 2.5 mg twice daily, metformin 1000 mg twice daily.  Continue BS checks BID.  Carb controlled diet.     (I10) Essential hypertension  Comment: Chronic  -BP controlled 153/84, 120/67, 124/79; HR 84, 86, 89  Plan: Continue lisinopril 5 mg p.o. daily. VS per policy. Adjust medications as needed.     (E78.5) Dyslipidemia  Comment: Chronic  Plan: Continue atorvastatin 80 mg at bedtime.     (N18.31) CKD (chronic kidney disease) stage 3a  Comment: Chronic  -Creatinine 1.05 on 9/19/2022  Plan: BMP PRN. Avoid nephrotoxins. Renally dose medications as indicated.     (F41.8) Depression with anxiety  Comment: Chronic, mood today seems okay  Plan: Continue citalopram 20 mg daily.  Monitor mood and symptoms.  ACP referral as needed.     (K59.01) Slow transit constipation  Comment: bowels moving regularly now  Plan: Continue miralax  BID. Continue senna S 2 tabs BID. Monitor bowels closely.      (K21.9) Gastroesophageal reflux disease, unspecified whether esophagitis present  Comment: chronic  Plan: Continue omeprazole 20 mg daily.     (E66.01,  Z68.36) Class 2 severe obesity with serious comorbidity and body mass index (BMI) of 36.0 to 36.9 in adult, unspecified obesity type (H)  Comment: chronic  Estimated body mass index is 36.11 kg/m  as calculated from the following:    Height as of this encounter: 1.651 m (5' 5\").    Weight as of this encounter: 98.4 kg (217 lb).    Plan: Encourage weight loss. Follow up with outpatient     (R53.81) Physical deconditioning  Comment: Acute, secondary to recent hospitalization, medical conditions as above  -continues to work with therapy  Plan: Encourage participation in physical therapy/occupational " therapy for strengthening and deconditioning. Discharge planning per their recommendation. Social work to assist with d/c planning.         Post Medication Reconciliation Status: Medication reconciliation previously completed during another office visit          Electronically signed by: BALTA Caruso CNP             Sincerely,        BALTA Caruso CNP

## 2022-09-21 NOTE — PATIENT INSTRUCTIONS
Orders  Erin Ott  1950  1) Discontinue current oxycodone orders  2) Reduce oxycodone 5-10mg q6h PRN for pain  3) CBC 9/26. Diagnosis: anemia  BALTA Caruso CNP on 9/21/2022 at 10:24 AM

## 2022-09-21 NOTE — PROGRESS NOTES
[facility] GERIATRICS    Chief Complaint   Patient presents with     RECHECK     HPI:  Erin Ott is a 72 year old  (1950), who is being seen today for an episodic care visit at: [facility]  ([facility]) [[id number]].     PMH significant for type 2 diabetes, hypertension, dyslipidemia, restless leg syndrome, MARIOLA, depression with anxiety GERD, obesity.     Summary of recent hospitalization:   Erin Ott was hospitalized at [facility] from 9/12-9/15/2022 for elective knee replacement for osteoarthritis. She is s/p p left knee unicompartmental knee arthroplasty and Open reduction and internal fixation of left tibial eminence fracture on 9/12/2022. With drop in hemoglobin post operatively.Hemoglobin 12.0 on 9/12/2022, prior to surgery--> 10.2 post op. Hospitalization otherwise uncomplicated. Discharged to [facility] for physical rehabilitation and medical management.     Today's concern is: Erin was seen today for routine follow-up in the TCU.  She reports ongoing severe pain at times to her left knee.  She is using oxycodone around-the-clock.  She feels like it is helpful for her pain, but she is trying to avoid developing severe pain, which happened in the hospital.  She denies shortness of breath, chest pain, dizziness/lightheadedness.  She tells me her bowels are moving regularly.  Her appetite is good.  She continues to work with therapy.  She has questions today about how long she will be here, which I directed her to ask therapy and let therapy know she was wondering about this. Has follow up with ortho scheduled for 9/27 and needs transport arranged.    Allergies, and PMH/PSH reviewed in EPIC today.  REVIEW OF SYSTEMS:  10 point ROS of systems including Constitutional, Eyes, Respiratory, Cardiovascular, Gastroenterology, Genitourinary, Integumentary, Musculoskeletal, Psychiatric were all negative except for pertinent positives noted in my HPI.    Objective:   /67   Pulse 84   Temp  "98.6  F (37  C)   Resp 16   Ht 1.651 m (5' 5\")   Wt 98.4 kg (217 lb)   SpO2 94%   BMI 36.11 kg/m    GENERAL APPEARANCE:  Alert, in NAD  HEENT: normocephalic, moist mucous membranes, nose without drainage or crusting  RESP:  respiratory effort normal, no respiratory distress, Lung sounds clear, patient is on RA  CV: auscultation of heart done, rate and rhythm regular.   ABDOMEN: + bowel sounds, soft, nontender, no grimacing or guarding with palpation.  M/S: generalized edema around the left knee  SKIN:  Inspection and palpation of skin and subcutaneous tissue: skin warm, dry without rashes; incision with surgical glue in place- incision is C/D/I, also with some bruising to left knee and shin  NEURO: cranial nerves 2-12 grossly intact and at patient's baseline; moves extremities freely  PSYCH: oriented x 3, affect and mood ok    Labs done in SNF are in [facility]. Please refer to them using [employer] Everywhere. and Recent labs in [facility] reviewed by me today.     Assessment/Plan:  (M17.12) Osteoarthritis of left knee, unspecified osteoarthritis type  (primary encounter diagnosis)  (Z96.652) S/P left unicompartmental knee replacement  (Z98.890,  Z87.81) S/P ORIF (open reduction internal fixation) fracture  Comment: s/p left knee unicompartmental knee arthroplasty and Open reduction and internal fixation of left tibial eminence fracture on 9/12/2022  -continues to have severe pain at times  -is using oxycodone around the clock  Plan: Continue pain management with Tylenol 1000 mg TID. She is agreeable to reducing oxycodone to 5 to 10 mg every 6 hours as needed. Continue aspirin 325 mg p.o. twice daily through 10/20/2022. Continue incentive spirometer.  Weightbearing as tolerated.  Follow-up with Ortho 9/27. Asked facility to arrange transport per her request.     (D62) Acute blood loss anemia  Comment: Secondary to surgery  -Hemoglobin 12.0 on 9/12/2022, prior to surgery  -Hemoglobin 10.0 on 9/19  Plan: CBC " "9/26.     (E11.9) Type 2 diabetes mellitus without complication, without long-term current use of insulin (H)  Comment: Chronic  -Hemoglobin A1c 7.2 on 9/13/2022  BS   Plan: Continue glipizide 2.5 mg twice daily, metformin 1000 mg twice daily.  Continue BS checks BID.  Carb controlled diet.     (I10) Essential hypertension  Comment: Chronic  -BP controlled 153/84, 120/67, 124/79; HR 84, 86, 89  Plan: Continue lisinopril 5 mg p.o. daily. VS per policy. Adjust medications as needed.     (E78.5) Dyslipidemia  Comment: Chronic  Plan: Continue atorvastatin 80 mg at bedtime.     (N18.31) CKD (chronic kidney disease) stage 3a  Comment: Chronic  -Creatinine 1.05 on 9/19/2022  Plan: BMP PRN. Avoid nephrotoxins. Renally dose medications as indicated.     (F41.8) Depression with anxiety  Comment: Chronic, mood today seems okay  Plan: Continue citalopram 20 mg daily.  Monitor mood and symptoms.  ACP referral as needed.     (K59.01) Slow transit constipation  Comment: bowels moving regularly now  Plan: Continue miralax  BID. Continue senna S 2 tabs BID. Monitor bowels closely.      (K21.9) Gastroesophageal reflux disease, unspecified whether esophagitis present  Comment: chronic  Plan: Continue omeprazole 20 mg daily.     (E66.01,  Z68.36) Class 2 severe obesity with serious comorbidity and body mass index (BMI) of 36.0 to 36.9 in adult, unspecified obesity type (H)  Comment: chronic  Estimated body mass index is 36.11 kg/m  as calculated from the following:    Height as of this encounter: 1.651 m (5' 5\").    Weight as of this encounter: 98.4 kg (217 lb).    Plan: Encourage weight loss. Follow up with outpatient     (R53.81) Physical deconditioning  Comment: Acute, secondary to recent hospitalization, medical conditions as above  -continues to work with therapy  Plan: Encourage participation in physical therapy/occupational therapy for strengthening and deconditioning. Discharge planning per their recommendation. Social " work to assist with d/c planning.         Post Medication Reconciliation Status: Medication reconciliation previously completed during another office visit          Electronically signed by: BALTA Caruso CNP

## 2022-09-22 ENCOUNTER — DISCHARGE SUMMARY NURSING HOME (OUTPATIENT)
Dept: GERIATRICS | Facility: CLINIC | Age: 72
End: 2022-09-22
Payer: COMMERCIAL

## 2022-09-22 VITALS
HEART RATE: 73 BPM | TEMPERATURE: 97.2 F | RESPIRATION RATE: 16 BRPM | HEIGHT: 65 IN | WEIGHT: 217 LBS | BODY MASS INDEX: 36.15 KG/M2 | OXYGEN SATURATION: 96 % | DIASTOLIC BLOOD PRESSURE: 79 MMHG | SYSTOLIC BLOOD PRESSURE: 146 MMHG

## 2022-09-22 DIAGNOSIS — Z87.81 S/P ORIF (OPEN REDUCTION INTERNAL FIXATION) FRACTURE: ICD-10-CM

## 2022-09-22 DIAGNOSIS — F41.8 DEPRESSION WITH ANXIETY: ICD-10-CM

## 2022-09-22 DIAGNOSIS — E78.5 DYSLIPIDEMIA: ICD-10-CM

## 2022-09-22 DIAGNOSIS — K59.01 SLOW TRANSIT CONSTIPATION: ICD-10-CM

## 2022-09-22 DIAGNOSIS — E66.812 CLASS 2 SEVERE OBESITY WITH SERIOUS COMORBIDITY AND BODY MASS INDEX (BMI) OF 36.0 TO 36.9 IN ADULT, UNSPECIFIED OBESITY TYPE (H): ICD-10-CM

## 2022-09-22 DIAGNOSIS — M17.12 OSTEOARTHRITIS OF LEFT KNEE, UNSPECIFIED OSTEOARTHRITIS TYPE: Primary | ICD-10-CM

## 2022-09-22 DIAGNOSIS — N18.31 STAGE 3A CHRONIC KIDNEY DISEASE (H): ICD-10-CM

## 2022-09-22 DIAGNOSIS — E11.9 TYPE 2 DIABETES MELLITUS WITHOUT COMPLICATION, WITHOUT LONG-TERM CURRENT USE OF INSULIN (H): ICD-10-CM

## 2022-09-22 DIAGNOSIS — R53.81 PHYSICAL DECONDITIONING: ICD-10-CM

## 2022-09-22 DIAGNOSIS — Z98.890 S/P ORIF (OPEN REDUCTION INTERNAL FIXATION) FRACTURE: ICD-10-CM

## 2022-09-22 DIAGNOSIS — Z96.652 S/P LEFT UNICOMPARTMENTAL KNEE REPLACEMENT: ICD-10-CM

## 2022-09-22 DIAGNOSIS — I10 ESSENTIAL HYPERTENSION: ICD-10-CM

## 2022-09-22 DIAGNOSIS — E66.01 CLASS 2 SEVERE OBESITY WITH SERIOUS COMORBIDITY AND BODY MASS INDEX (BMI) OF 36.0 TO 36.9 IN ADULT, UNSPECIFIED OBESITY TYPE (H): ICD-10-CM

## 2022-09-22 DIAGNOSIS — D62 ACUTE BLOOD LOSS ANEMIA: ICD-10-CM

## 2022-09-22 PROCEDURE — 99316 NF DSCHRG MGMT 30 MIN+: CPT | Performed by: NURSE PRACTITIONER

## 2022-09-22 RX ORDER — ACETAMINOPHEN 325 MG/1
650 TABLET ORAL EVERY 6 HOURS PRN
Start: 2022-09-22

## 2022-09-22 NOTE — LETTER
9/22/2022        RE: Erin Ott  [address] [facility] [address]        [facility] GERIATRICS DISCHARGE SUMMARY  PATIENT'S NAME: Erin Ott  YOB: 1950  MEDICAL RECORD NUMBER:  [id number]  Place of Service where encounter took place:  [facility]  ([facility]) [[id number]]    PRIMARY CARE PROVIDER AND CLINIC RESPONSIBLE AFTER TRANSFER:   Jhon Lee MD, [address] [address] / [place] [address]    Non-[facility] Provider     Transferring providers: BALTA Caruso CNP, Kalani Dickson MD  Recent Hospitalization/ED:  [facility] stay 9/12/22 to 9/15/22.  Date of SNF Admission: September 15, 2022  Date of SNF (anticipated) Discharge: September 22, 2022-patient driven, insurance set last covered day at facility for 9/23  Discharged to: previous independent home  Cognitive Scores: SLUMS 22/30; CPT 5.1/5.6  Physical Function: Ambulating 50 to 100 feet with four-wheel walker, minimal assistance for lower body dressing, modified independence for toileting, supervision for upper body dressing  DME:walker    CODE STATUS/ADVANCE DIRECTIVES DISCUSSION:  FULL  ALLERGIES: Latex, Melatonin, Adhesive tape, and Ak-mycin [erythromycin]    NURSING FACILITY COURSE   PMH significant for type 2 diabetes, hypertension, dyslipidemia, restless leg syndrome, MARIOLA, depression with anxiety GERD, obesity.     Summary of recent hospitalization:   Erin Ott was hospitalized at [facility] from 9/12-9/15/2022 for elective knee replacement for osteoarthritis. She is s/p p left knee unicompartmental knee arthroplasty and Open reduction and internal fixation of left tibial eminence fracture on 9/12/2022. With drop in hemoglobin post operatively.Hemoglobin 12.0 on 9/12/2022, prior to surgery--> 10.2 post op. Hospitalization otherwise uncomplicated. Discharged to TCU for physical rehabilitation and medical management.     Summary of nursing facility stay:    While in the TCU patient worked with therapy and made progress, until her insurance set last covered day for 9/23, patient decided that she would like to discharge 9/23, so last covered day of therapy was today.  She will continue therapy through home care. She also has labs ordered for next week with results to PCP.  She should follow-up with her primary care provider in 1 week.  She should follow-up with Ortho as scheduled on 9/27.    Today, she reports that she has quite a bit of knee pain.  Also had a rough night, due to pain.  She otherwise denies shortness of breath, chest pain, dizziness/lightheadedness.  Her bowels are moving well.  She denies dysuria/trouble urinating.      Specific problems addressed in TCU:  (M17.12) Osteoarthritis of left knee, unspecified osteoarthritis type  (primary encounter diagnosis)  (Z96.652) S/P left unicompartmental knee replacement  (Z98.890,  Z87.81) S/P ORIF (open reduction internal fixation) fracture  Comment: s/p left knee unicompartmental knee arthroplasty and Open reduction and internal fixation of left tibial eminence fracture on 9/12/2022  -continues to have severe pain at times, including at time of visit  -had been using oxycodone around the clock and reduced frequency yesterday to q6h PRN  Plan: Continue pain management with Tylenol PRN, oxycodone to 5 to 10 mg every 6 hours as needed. Continue aspirin 325 mg p.o. twice daily through 10/20/2022. Continue incentive spirometer.  Weightbearing as tolerated.  Follow-up with Ortho 9/27. Asked facility to arrange transport per her request.     (D62) Acute blood loss anemia  Comment: Secondary to surgery  -Hemoglobin 12.0 on 9/12/2022, prior to surgery  -Hemoglobin 10.0 on 9/19  Plan: CBC 9/28 through home care with results to PCP.     (E11.9) Type 2 diabetes mellitus without complication, without long-term current use of insulin (H)  Comment: Chronic  -Hemoglobin A1c 7.2 on 9/13/2022  BS   Plan: Continue glipizide  "2.5 mg twice daily, metformin 1000 mg twice daily.  Continue BS checks BID.  Carb controlled diet.     (I10) Essential hypertension  Comment: Chronic  -BP with fair control- suspect pain is contributing to some of these elevated BPs 146/79, 135/78, 153/84, 120/67  Plan: Continue lisinopril 5 mg p.o. daily. Follow up with PCP.     (E78.5) Dyslipidemia  Comment: Chronic  Plan: Continue atorvastatin 80 mg at bedtime.     (N18.31) CKD (chronic kidney disease) stage 3a  Comment: Chronic  -Creatinine 1.05 on 9/19/2022  Plan: BMP PRN. Avoid nephrotoxins. Renally dose medications as indicated.     (F41.8) Depression with anxiety  Comment: Chronic, mood today seems okay  Plan: Continue citalopram 20 mg daily.  Monitor mood and symptoms.  ACP referral as needed.     (K59.01) Slow transit constipation  Comment: bowels moving regularly now  Plan: Continue miralax BID. Continue senna S 2 tabs BID. Monitor bowels closely.      (K21.9) Gastroesophageal reflux disease, unspecified whether esophagitis present  Comment: chronic  Plan: Continue omeprazole 20 mg daily.     (E66.01,  Z68.36) Class 2 severe obesity with serious comorbidity and body mass index (BMI) of 36.0 to 36.9 in adult, unspecified obesity type (H)  Comment: chronic  Estimated body mass index is 36.11 kg/m  as calculated from the following:    Height as of this encounter: 1.651 m (5' 5\").    Weight as of this encounter: 98.4 kg (217 lb).    Plan: Encourage weight loss. Follow up with outpatient     (R53.81) Physical deconditioning  Comment: Acute, secondary to recent hospitalization, medical conditions as above  -completed course of therapy in TCU  Plan: Continue therapy through home care       Discharge Medications:  Post Medication Reconciliation Status: Medication reconciliation previously completed during another office visit      Current Outpatient Medications   Medication Sig Dispense Refill     acetaminophen (TYLENOL) 325 MG tablet Take 2 tablets (650 mg) by " "mouth every 6 hours as needed for mild pain       aspirin (ASA) 325 MG EC tablet Take 1 tablet (325 mg) by mouth 2 times daily Through 10/20 then STOP       atorvastatin (LIPITOR) 80 MG tablet Take 80 mg by mouth daily       Calcium Carb-Cholecalciferol (CALCIUM + D3 PO) Take by mouth daily       citalopram (CELEXA) 20 MG tablet Take 20 mg by mouth daily       Cyanocobalamin (B-12 PO) Take by mouth daily       glipiZIDE (GLUCOTROL) 5 MG tablet Take 2.5 mg by mouth 2 times daily (before meals) Take one half tablet in the morning and one half tab in the evening       lisinopril (ZESTRIL) 5 MG tablet Take 5 mg by mouth daily       metFORMIN (GLUCOPHAGE) 500 MG tablet Take 1,000 mg by mouth 2 times daily (with meals)       omeprazole (PRILOSEC) 20 MG DR capsule Take 20 mg by mouth daily       oxyCODONE (ROXICODONE) 5 MG tablet Take 1-2 tablets (5-10 mg) by mouth every 6 hours as needed for moderate to severe pain 30 tablet 0     polyethylene glycol (MIRALAX) 17 GM/Dose powder Take 17 g by mouth 2 times daily 510 g      senna-docusate (SENOKOT-S/PERICOLACE) 8.6-50 MG tablet Take 2 tablets by mouth 2 times daily Take while on oral narcotics to prevent or treat constipation. 30 tablet 0     VITAMIN E PO Take by mouth daily          Controlled medications:   Medication: oxycodone  , 24 tabs given to patient at the time of discharge to take home     Past Medical History:   Past Medical History:   Diagnosis Date     Arthritis      Depression      Diabetes (H)      Gastroesophageal reflux disease      Hypertension      Obese      PONV (postoperative nausea and vomiting)      Physical Exam:   Vitals: BP (!) 146/79   Pulse 73   Temp 97.2  F (36.2  C)   Resp 16   Ht 1.651 m (5' 5\")   Wt 98.4 kg (217 lb)   SpO2 96%   BMI 36.11 kg/m    BMI: Body mass index is 36.11 kg/m .  GENERAL APPEARANCE:  Alert, in NAD  HEENT: normocephalic, moist mucous membranes, nose without drainage or crusting  RESP:  respiratory effort normal, no " respiratory distress, Lung sounds clear, patient is on RA  CV: auscultation of heart done, rate and rhythm regular.   ABDOMEN: + bowel sounds, soft, nontender, no grimacing or guarding with palpation.  M/S: generalized edema around the left knee  SKIN:  Inspection and palpation of skin and subcutaneous tissue: skin warm, dry without rashes; incision with surgical glue in place- incision is C/D/I, also with some bruising to left knee and shin that is unchanged from visit yesterday  NEURO: cranial nerves 2-12 grossly intact and at patient's baseline; moves extremities freely  PSYCH: oriented x 3, affect and mood ok    SNF labs: Labs done in SNF are in [facility]. Please refer to them using [facility] Everywhere. and Recent labs in [facility] reviewed by me today.     DISCHARGE PLAN:    Follow up labs: CBC due 9/28 to be drawn by home care with results to PCP    Medical Follow Up:     Follow up with primary care provider in 1 week  Follow up with specialist ortho as scheduled for 9/27    Discharge Services: Home Care:  Occupational Therapy, Physical Therapy, Registered Nurse, Home Health Aide and From:  [facility]    Discharge Instructions:     Weight bearing restrictions:  Weight bearing as tolerated.     Monitor blood glucose monitoring 2 times a day. Keep a record and bring it to your next primary provider visit.     Continue to follow your diet:  Carbohydrate Controlled Diet.     OK to shower but no bathing or soaking until approved by surgeon.     Notify your surgeon if you have increased redness, swelling, tenderness, or drainage at your incision site.     DO NOT DRIVE while taking narcotic pain medications.     Driving is not recommended until cleared by orthopedics to resume.       TOTAL DISCHARGE TIME:   Greater than 30 minutes  Electronically signed by:  BALTA Caruso CNP     Home care Face to Face documentation done in [facility] attached to Home care orders for [facility].       [facility]  Face to Face and Medical Necessity Statement for DME Provider visit    Patient: Erin Ott  Gender: female  YOB: 1950  [facility] Record Number: [id number]  Demographics:  [address] [facility] [address]  [phone] (home)   Social Security Number: [id number]  Primary Care Provider: Jhon Lee  Insurance: Payor: [facility] / Plan: [facility] MEDICARE ADVANTAGE / Product Type: Medicare /     HPI: Erin Ott is a 72 year old (1950), who is being seen today for a face to face provider visit at [facility]  ([facility]) [[id number]]. Medical necessity statement for DME included.     This patient requires the following: DME Ordered and Medical Necessity Statement   The patient has a mobility limitation of reduced balance and activity tolerance limit her ability to safely complete ADLs, placing her at an increased risk for falls. This significantly impairs his ability to participate in one or more mobility-related activities of daily living in the home. The patient is able to safely use the walker and the functional mobility deficit can be sufficiently resolved with the use of a walker      Pt needing above DME with expected length of need of 99 due to medical necessity associated with following diagnosis:     Osteoarthritis of left knee, unspecified osteoarthritis type  S/P left unicompartmental knee replacement  S/P ORIF (open reduction internal fixation) fracture  Acute blood loss anemia  Type 2 diabetes mellitus without complication, without long-term current use of insulin (H)  Essential hypertension  Dyslipidemia  Stage 3a chronic kidney disease (H)  Depression with anxiety  Slow transit constipation  Class 2 severe obesity with serious comorbidity and body mass index (BMI) of 36.0 to 36.9 in adult, unspecified obesity type (H)  Physical deconditioning      Past Medical History:   has a past medical history of Arthritis, Depression, Diabetes (H),  Gastroesophageal reflux disease, Hypertension, Obese, and PONV (postoperative nausea and vomiting).    She has no past medical history of Chronic infection, Complication of anesthesia, History of blood transfusion, Malignant hyperthermia, or Sleep apnea.      ELECTRONICALLY SIGNED BY JOSE FRANCISCO CERTIFIED PROVIDER: BALTA Caruso CNP    NPI: [id number]  [facility] [address] [place] [address]              Sincerely,        BALTA Caruso CNP

## 2022-09-22 NOTE — PROGRESS NOTES
[facility] GERIATRICS DISCHARGE SUMMARY  PATIENT'S NAME: Erin Ott  YOB: 1950  MEDICAL RECORD NUMBER:  [id number]  Place of Service where encounter took place:  [facility]  (U) [[id number]]    PRIMARY CARE PROVIDER AND CLINIC RESPONSIBLE AFTER TRANSFER:   Jhon Lee MD, [address] / [place] [address]    Non-FMG Provider     Transferring providers: BALTA Caruso CNP, Kalani Dickson MD  Recent Hospitalization/ED:  [facility] stay 9/12/22 to 9/15/22.  Date of SNF Admission: September 15, 2022  Date of SNF (anticipated) Discharge: September 22, 2022-patient driven, insurance set last covered day at facility for 9/23  Discharged to: previous independent home  Cognitive Scores: SLUMS 22/30; CPT 5.1/5.6  Physical Function: Ambulating 50 to 100 feet with four-wheel walker, minimal assistance for lower body dressing, modified independence for toileting, supervision for upper body dressing  DME:walker    CODE STATUS/ADVANCE DIRECTIVES DISCUSSION:  FULL  ALLERGIES: Latex, Melatonin, Adhesive tape, and Ak-mycin [erythromycin]    NURSING FACILITY COURSE   PMH significant for type 2 diabetes, hypertension, dyslipidemia, restless leg syndrome, MARIOLA, depression with anxiety GERD, obesity.     Summary of recent hospitalization:   Erin Ott was hospitalized at [facility] from 9/12-9/15/2022 for elective knee replacement for osteoarthritis. She is s/p p left knee unicompartmental knee arthroplasty and Open reduction and internal fixation of left tibial eminence fracture on 9/12/2022. With drop in hemoglobin post operatively.Hemoglobin 12.0 on 9/12/2022, prior to surgery--> 10.2 post op. Hospitalization otherwise uncomplicated. Discharged to TCU for physical rehabilitation and medical management.     Summary of nursing facility stay:   While in the TCU patient worked with therapy and made progress, until her insurance  set last covered day for 9/23, patient decided that she would like to discharge 9/23, so last covered day of therapy was today.  She will continue therapy through home care. She also has labs ordered for next week with results to PCP.  She should follow-up with her primary care provider in 1 week.  She should follow-up with Ortho as scheduled on 9/27.    Today, she reports that she has quite a bit of knee pain.  Also had a rough night, due to pain.  She otherwise denies shortness of breath, chest pain, dizziness/lightheadedness.  Her bowels are moving well.  She denies dysuria/trouble urinating.      Specific problems addressed in TCU:  (M17.12) Osteoarthritis of left knee, unspecified osteoarthritis type  (primary encounter diagnosis)  (Z96.652) S/P left unicompartmental knee replacement  (Z98.890,  Z87.81) S/P ORIF (open reduction internal fixation) fracture  Comment: s/p left knee unicompartmental knee arthroplasty and Open reduction and internal fixation of left tibial eminence fracture on 9/12/2022  -continues to have severe pain at times, including at time of visit  -had been using oxycodone around the clock and reduced frequency yesterday to q6h PRN  Plan: Continue pain management with Tylenol PRN, oxycodone to 5 to 10 mg every 6 hours as needed. Continue aspirin 325 mg p.o. twice daily through 10/20/2022. Continue incentive spirometer.  Weightbearing as tolerated.  Follow-up with Ortho 9/27. Asked facility to arrange transport per her request.     (D62) Acute blood loss anemia  Comment: Secondary to surgery  -Hemoglobin 12.0 on 9/12/2022, prior to surgery  -Hemoglobin 10.0 on 9/19  Plan: CBC 9/28 through home care with results to PCP.     (E11.9) Type 2 diabetes mellitus without complication, without long-term current use of insulin (H)  Comment: Chronic  -Hemoglobin A1c 7.2 on 9/13/2022  BS   Plan: Continue glipizide 2.5 mg twice daily, metformin 1000 mg twice daily.  Continue BS checks BID.  Carb  "controlled diet.     (I10) Essential hypertension  Comment: Chronic  -BP with fair control- suspect pain is contributing to some of these elevated BPs 146/79, 135/78, 153/84, 120/67  Plan: Continue lisinopril 5 mg p.o. daily. Follow up with PCP.     (E78.5) Dyslipidemia  Comment: Chronic  Plan: Continue atorvastatin 80 mg at bedtime.     (N18.31) CKD (chronic kidney disease) stage 3a  Comment: Chronic  -Creatinine 1.05 on 9/19/2022  Plan: BMP PRN. Avoid nephrotoxins. Renally dose medications as indicated.     (F41.8) Depression with anxiety  Comment: Chronic, mood today seems okay  Plan: Continue citalopram 20 mg daily.  Monitor mood and symptoms.  ACP referral as needed.     (K59.01) Slow transit constipation  Comment: bowels moving regularly now  Plan: Continue miralax BID. Continue senna S 2 tabs BID. Monitor bowels closely.      (K21.9) Gastroesophageal reflux disease, unspecified whether esophagitis present  Comment: chronic  Plan: Continue omeprazole 20 mg daily.     (E66.01,  Z68.36) Class 2 severe obesity with serious comorbidity and body mass index (BMI) of 36.0 to 36.9 in adult, unspecified obesity type (H)  Comment: chronic  Estimated body mass index is 36.11 kg/m  as calculated from the following:    Height as of this encounter: 1.651 m (5' 5\").    Weight as of this encounter: 98.4 kg (217 lb).    Plan: Encourage weight loss. Follow up with outpatient     (R53.81) Physical deconditioning  Comment: Acute, secondary to recent hospitalization, medical conditions as above  -completed course of therapy in TCU  Plan: Continue therapy through home care       Discharge Medications:  Post Medication Reconciliation Status: Medication reconciliation previously completed during another office visit      Current Outpatient Medications   Medication Sig Dispense Refill     acetaminophen (TYLENOL) 325 MG tablet Take 2 tablets (650 mg) by mouth every 6 hours as needed for mild pain       aspirin (ASA) 325 MG EC tablet " "Take 1 tablet (325 mg) by mouth 2 times daily Through 10/20 then STOP       atorvastatin (LIPITOR) 80 MG tablet Take 80 mg by mouth daily       Calcium Carb-Cholecalciferol (CALCIUM + D3 PO) Take by mouth daily       citalopram (CELEXA) 20 MG tablet Take 20 mg by mouth daily       Cyanocobalamin (B-12 PO) Take by mouth daily       glipiZIDE (GLUCOTROL) 5 MG tablet Take 2.5 mg by mouth 2 times daily (before meals) Take one half tablet in the morning and one half tab in the evening       lisinopril (ZESTRIL) 5 MG tablet Take 5 mg by mouth daily       metFORMIN (GLUCOPHAGE) 500 MG tablet Take 1,000 mg by mouth 2 times daily (with meals)       omeprazole (PRILOSEC) 20 MG DR capsule Take 20 mg by mouth daily       oxyCODONE (ROXICODONE) 5 MG tablet Take 1-2 tablets (5-10 mg) by mouth every 6 hours as needed for moderate to severe pain 30 tablet 0     polyethylene glycol (MIRALAX) 17 GM/Dose powder Take 17 g by mouth 2 times daily 510 g      senna-docusate (SENOKOT-S/PERICOLACE) 8.6-50 MG tablet Take 2 tablets by mouth 2 times daily Take while on oral narcotics to prevent or treat constipation. 30 tablet 0     VITAMIN E PO Take by mouth daily          Controlled medications:   Medication: oxycodone  , 24 tabs given to patient at the time of discharge to take home     Past Medical History:   Past Medical History:   Diagnosis Date     Arthritis      Depression      Diabetes (H)      Gastroesophageal reflux disease      Hypertension      Obese      PONV (postoperative nausea and vomiting)      Physical Exam:   Vitals: BP (!) 146/79   Pulse 73   Temp 97.2  F (36.2  C)   Resp 16   Ht 1.651 m (5' 5\")   Wt 98.4 kg (217 lb)   SpO2 96%   BMI 36.11 kg/m    BMI: Body mass index is 36.11 kg/m .  GENERAL APPEARANCE:  Alert, in NAD  HEENT: normocephalic, moist mucous membranes, nose without drainage or crusting  RESP:  respiratory effort normal, no respiratory distress, Lung sounds clear, patient is on RA  CV: auscultation of " heart done, rate and rhythm regular.   ABDOMEN: + bowel sounds, soft, nontender, no grimacing or guarding with palpation.  M/S: generalized edema around the left knee  SKIN:  Inspection and palpation of skin and subcutaneous tissue: skin warm, dry without rashes; incision with surgical glue in place- incision is C/D/I, also with some bruising to left knee and shin that is unchanged from visit yesterday  NEURO: cranial nerves 2-12 grossly intact and at patient's baseline; moves extremities freely  PSYCH: oriented x 3, affect and mood ok    SNF labs: Labs done in SNF are in [facility]. Please refer to them using [facility] Everywhere. and Recent labs in [facility] reviewed by me today.     DISCHARGE PLAN:    Follow up labs: CBC due 9/28 to be drawn by home care with results to PCP    Medical Follow Up:     Follow up with primary care provider in 1 week  Follow up with specialist ortho as scheduled for 9/27    Discharge Services: Home Care:  Occupational Therapy, Physical Therapy, Registered Nurse, Home Health Aide and From:  [facility]    Discharge Instructions:     Weight bearing restrictions:  Weight bearing as tolerated.     Monitor blood glucose monitoring 2 times a day. Keep a record and bring it to your next primary provider visit.     Continue to follow your diet:  Carbohydrate Controlled Diet.     OK to shower but no bathing or soaking until approved by surgeon.     Notify your surgeon if you have increased redness, swelling, tenderness, or drainage at your incision site.     DO NOT DRIVE while taking narcotic pain medications.     Driving is not recommended until cleared by orthopedics to resume.       TOTAL DISCHARGE TIME:   Greater than 30 minutes  Electronically signed by:  BALTA Caruso CNP     Home care Face to Face documentation done in [facility] attached to Home care orders for [facility].       [facility] GERIATRICS  Face to Face and Medical Necessity Statement for DME Provider  visit    Patient: Erin Ott  Gender: female  YOB: 1950  [place] Medical Record Number: [id number]  Demographics:  [address] [address]  [phone] (home)   Social Security Number: [id number]  Primary Care Provider: Jhon Lee  Insurance: Payor: [facility] / Plan: [facility] MEDICARE ADVANTAGE / Product Type: Medicare /     HPI: Erin Ott is a 72 year old (1950), who is being seen today for a face to face provider visit at [facility]  ([facility]) [[id number]]. Medical necessity statement for DME included.     This patient requires the following: DME Ordered and Medical Necessity Statement   The patient has a mobility limitation of reduced balance and activity tolerance limit her ability to safely complete ADLs, placing her at an increased risk for falls. This significantly impairs his ability to participate in one or more mobility-related activities of daily living in the home. The patient is able to safely use the walker and the functional mobility deficit can be sufficiently resolved with the use of a walker      Pt needing above DME with expected length of need of 99 due to medical necessity associated with following diagnosis:     Osteoarthritis of left knee, unspecified osteoarthritis type  S/P left unicompartmental knee replacement  S/P ORIF (open reduction internal fixation) fracture  Acute blood loss anemia  Type 2 diabetes mellitus without complication, without long-term current use of insulin (H)  Essential hypertension  Dyslipidemia  Stage 3a chronic kidney disease (H)  Depression with anxiety  Slow transit constipation  Class 2 severe obesity with serious comorbidity and body mass index (BMI) of 36.0 to 36.9 in adult, unspecified obesity type (H)  Physical deconditioning      Past Medical History:   has a past medical history of Arthritis, Depression, Diabetes (H), Gastroesophageal reflux disease, Hypertension, Obese, and PONV (postoperative nausea and  vomiting).    She has no past medical history of Chronic infection, Complication of anesthesia, History of blood transfusion, Malignant hyperthermia, or Sleep apnea.      ELECTRONICALLY SIGNED BY [place] CERTIFIED PROVIDER: BALTA Caruso CNP    NPI: [id number]  [facility]  [address] [place] [address]

## 2022-09-22 NOTE — PATIENT INSTRUCTIONS
[facility] Discharge Orders    Name: Erin Ott  : 1950  Planned Discharge Date: 2022  Discharged to: home    MEDICAL FOLLOW UP  Follow up with primary care provider in 1 week  Follow up with specialist ortho as scheduled for     FUTURE LABS: CBC due  to be drawn by home care with results to PCP    DISCHARGE MEDICATIONS:  The patient s pharmacy is authorized to dispense a 30-day supply of medications. Refill requests should be directed to the primary provider, Jhon Lee   Medication: oxycodone  , 24 tabs given to patient at the time of discharge to take home  Current Outpatient Medications   Medication Sig Dispense Refill    acetaminophen (TYLENOL) 325 MG tablet Take 2 tablets (650 mg) by mouth every 6 hours as needed for mild pain      aspirin (ASA) 325 MG EC tablet Take 1 tablet (325 mg) by mouth 2 times daily Through 10/20 then STOP      atorvastatin (LIPITOR) 80 MG tablet Take 80 mg by mouth daily      Calcium Carb-Cholecalciferol (CALCIUM + D3 PO) Take by mouth daily      citalopram (CELEXA) 20 MG tablet Take 20 mg by mouth daily      Cyanocobalamin (B-12 PO) Take by mouth daily      glipiZIDE (GLUCOTROL) 5 MG tablet Take 2.5 mg by mouth 2 times daily (before meals) Take one half tablet in the morning and one half tab in the evening      lisinopril (ZESTRIL) 5 MG tablet Take 5 mg by mouth daily      metFORMIN (GLUCOPHAGE) 500 MG tablet Take 1,000 mg by mouth 2 times daily (with meals)      omeprazole (PRILOSEC) 20 MG DR capsule Take 20 mg by mouth daily      oxyCODONE (ROXICODONE) 5 MG tablet Take 1-2 tablets (5-10 mg) by mouth every 6 hours as needed for moderate to severe pain 30 tablet 0    polyethylene glycol (MIRALAX) 17 GM/Dose powder Take 17 g by mouth 2 times daily 510 g     senna-docusate (SENOKOT-S/PERICOLACE) 8.6-50 MG tablet Take 2 tablets by mouth 2 times daily Take while on oral narcotics to prevent or treat constipation. 30 tablet 0    VITAMIN E  PO Take by mouth daily         SERVICES:  Home Care:  Occupational Therapy, Physical Therapy, Registered Nurse, Home Health Aide and From:  [facility]    ADDITIONAL INSTRUCTIONS:  Weight bearing restrictions:  Weight bearing as tolerated.   Monitor blood glucose monitoring 2 times a day. Keep a record and bring it to your next primary provider visit.   Continue to follow your diet:  Carbohydrate Controlled Diet.   OK to shower but no bathing or soaking until approved by surgeon.   Notify your surgeon if you have increased redness, swelling, tenderness, or drainage at your incision site.   DO NOT DRIVE while taking narcotic pain medications.   Driving is not recommended until cleared by orthopedics to resume.     BALTA Caruso CNP  This document was electronically signed on September 22, 2022

## 2022-09-25 ENCOUNTER — LAB REQUISITION (OUTPATIENT)
Dept: LAB | Facility: CLINIC | Age: 72
End: 2022-09-25
Payer: COMMERCIAL

## 2022-09-25 DIAGNOSIS — D64.9 ANEMIA, UNSPECIFIED: ICD-10-CM

## 2023-08-14 PROCEDURE — 88305 TISSUE EXAM BY PATHOLOGIST: CPT | Mod: 26 | Performed by: PATHOLOGY

## 2023-08-14 PROCEDURE — 88305 TISSUE EXAM BY PATHOLOGIST: CPT | Mod: TC,ORL | Performed by: INTERNAL MEDICINE

## 2023-08-15 ENCOUNTER — LAB REQUISITION (OUTPATIENT)
Dept: LAB | Facility: CLINIC | Age: 73
End: 2023-08-15
Payer: COMMERCIAL

## 2023-08-15 DIAGNOSIS — K31.89 OTHER DISEASES OF STOMACH AND DUODENUM: ICD-10-CM

## 2023-08-15 DIAGNOSIS — K44.9 DIAPHRAGMATIC HERNIA WITHOUT OBSTRUCTION OR GANGRENE: ICD-10-CM

## 2023-08-15 DIAGNOSIS — K30 FUNCTIONAL DYSPEPSIA: ICD-10-CM

## 2023-08-15 DIAGNOSIS — R12 HEARTBURN: ICD-10-CM

## 2023-08-15 DIAGNOSIS — R10.13 EPIGASTRIC PAIN: ICD-10-CM

## 2023-08-16 LAB
PATH REPORT.COMMENTS IMP SPEC: NORMAL
PATH REPORT.COMMENTS IMP SPEC: NORMAL
PATH REPORT.FINAL DX SPEC: NORMAL
PATH REPORT.GROSS SPEC: NORMAL
PATH REPORT.MICROSCOPIC SPEC OTHER STN: NORMAL
PATH REPORT.RELEVANT HX SPEC: NORMAL
PHOTO IMAGE: NORMAL

## (undated) DEVICE — GOWN IMPERVIOUS SPECIALTY XLG/XLONG 32474

## (undated) DEVICE — SU VICRYL+ 1 MO-4 18" DYED VCP702D

## (undated) DEVICE — LINEN DRAPE 54X72" 5467

## (undated) DEVICE — DEVICE RETRIEVER HEWSON 71111579

## (undated) DEVICE — LINEN FULL SHEET 5511

## (undated) DEVICE — Device

## (undated) DEVICE — SU STRATAFIX PDS PLUS 1 CT-1 12" SXPP1A443

## (undated) DEVICE — DRAPE STERI U 1015

## (undated) DEVICE — DRAPE STERI TOWEL LG 1010

## (undated) DEVICE — PACK TOTAL KNEE BOXED LATEX FREE PO15TKFCT

## (undated) DEVICE — GLOVE PROTEXIS BLUE W/NEU-THERA 8.0  2D73EB80

## (undated) DEVICE — SOL NACL 0.9% IRRIG 3000ML BAG 2B7477

## (undated) DEVICE — HOOD FLYTE W/PEELAWAY 408-800-100

## (undated) DEVICE — SUCTION MANIFOLD NEPTUNE 2 SYS 4 PORT 0702-020-000

## (undated) DEVICE — LINEN ORTHO ACL PACK 5447

## (undated) DEVICE — PREP CHLORAPREP 26ML TINTED HI-LITE ORANGE 930815

## (undated) DEVICE — GLOVE PROTEXIS POWDER FREE SMT 8.0  2D72PT80X

## (undated) DEVICE — SUTURE MONOCRYL+ 2-0 CT-1 36" UNDYED MCP945H

## (undated) DEVICE — SU FIBERWIRE 2 38"  AR-7200

## (undated) DEVICE — GLOVE PROTEXIS BLUE W/NEU-THERA 7.0  2D73EB70

## (undated) DEVICE — CAST PADDING 6" STERILE 9046S

## (undated) DEVICE — GLOVE PROTEXIS POWDER FREE 7.5 ORTHOPEDIC 2D73ET75

## (undated) DEVICE — BLADE SAW SAGITTAL STRK 13X90X1.27MM HD SYS 6 6113-127-090

## (undated) DEVICE — TOURNIQUET SGL  BLADDER 30"X4" BLUE 5921030135

## (undated) DEVICE — BAG CLEAR TRASH 1.3M 39X33" P4040C

## (undated) DEVICE — BLADE SAW RECIP STRK LONG 70X12.5X0.9MM 0277-096-278

## (undated) DEVICE — GLOVE PROTEXIS POWDER FREE 7.0 ORTHOPEDIC 2D73ET70

## (undated) DEVICE — SET HANDPIECE INTERPULSE W/COAXIAL FAN SPRAY TIP 0210118000

## (undated) RX ORDER — EPHEDRINE SULFATE 50 MG/ML
INJECTION, SOLUTION INTRAMUSCULAR; INTRAVENOUS; SUBCUTANEOUS
Status: DISPENSED
Start: 2022-09-12

## (undated) RX ORDER — LIDOCAINE HYDROCHLORIDE 10 MG/ML
INJECTION, SOLUTION EPIDURAL; INFILTRATION; INTRACAUDAL; PERINEURAL
Status: DISPENSED
Start: 2022-09-12

## (undated) RX ORDER — ONDANSETRON 2 MG/ML
INJECTION INTRAMUSCULAR; INTRAVENOUS
Status: DISPENSED
Start: 2022-09-12

## (undated) RX ORDER — DEXAMETHASONE SODIUM PHOSPHATE 4 MG/ML
INJECTION, SOLUTION INTRA-ARTICULAR; INTRALESIONAL; INTRAMUSCULAR; INTRAVENOUS; SOFT TISSUE
Status: DISPENSED
Start: 2022-09-12

## (undated) RX ORDER — VANCOMYCIN HYDROCHLORIDE 1 G/20ML
INJECTION, POWDER, LYOPHILIZED, FOR SOLUTION INTRAVENOUS
Status: DISPENSED
Start: 2022-09-12

## (undated) RX ORDER — PROPOFOL 10 MG/ML
INJECTION, EMULSION INTRAVENOUS
Status: DISPENSED
Start: 2022-09-12

## (undated) RX ORDER — OXYCODONE HYDROCHLORIDE 5 MG/1
TABLET ORAL
Status: DISPENSED
Start: 2022-09-12

## (undated) RX ORDER — GLYCOPYRROLATE 0.2 MG/ML
INJECTION INTRAMUSCULAR; INTRAVENOUS
Status: DISPENSED
Start: 2022-09-12

## (undated) RX ORDER — CEFAZOLIN SODIUM/WATER 2 G/20 ML
SYRINGE (ML) INTRAVENOUS
Status: DISPENSED
Start: 2022-09-12

## (undated) RX ORDER — HYDRALAZINE HYDROCHLORIDE 20 MG/ML
INJECTION INTRAMUSCULAR; INTRAVENOUS
Status: DISPENSED
Start: 2022-09-12

## (undated) RX ORDER — CELECOXIB 200 MG/1
CAPSULE ORAL
Status: DISPENSED
Start: 2022-09-12

## (undated) RX ORDER — TRANEXAMIC ACID 650 MG/1
TABLET ORAL
Status: DISPENSED
Start: 2022-09-12

## (undated) RX ORDER — FENTANYL CITRATE 50 UG/ML
INJECTION, SOLUTION INTRAMUSCULAR; INTRAVENOUS
Status: DISPENSED
Start: 2022-09-12